# Patient Record
Sex: FEMALE | Race: ASIAN | NOT HISPANIC OR LATINO | Employment: UNEMPLOYED | URBAN - METROPOLITAN AREA
[De-identification: names, ages, dates, MRNs, and addresses within clinical notes are randomized per-mention and may not be internally consistent; named-entity substitution may affect disease eponyms.]

---

## 2023-06-08 ENCOUNTER — OFFICE VISIT (OUTPATIENT)
Dept: PRIMARY CARE CLINIC | Facility: CLINIC | Age: 38
End: 2023-06-08
Payer: COMMERCIAL

## 2023-06-08 VITALS
OXYGEN SATURATION: 97 % | WEIGHT: 112.38 LBS | HEART RATE: 73 BPM | SYSTOLIC BLOOD PRESSURE: 112 MMHG | BODY MASS INDEX: 22.06 KG/M2 | HEIGHT: 60 IN | DIASTOLIC BLOOD PRESSURE: 75 MMHG | TEMPERATURE: 99 F | RESPIRATION RATE: 17 BRPM

## 2023-06-08 DIAGNOSIS — E78.00 HYPERCHOLESTEROLEMIA: ICD-10-CM

## 2023-06-08 DIAGNOSIS — N84.0 UTERINE POLYP: ICD-10-CM

## 2023-06-08 DIAGNOSIS — Z00.00 ENCOUNTER FOR MEDICAL EXAMINATION TO ESTABLISH CARE: Primary | ICD-10-CM

## 2023-06-08 DIAGNOSIS — R10.9 ABDOMINAL DISCOMFORT: ICD-10-CM

## 2023-06-08 DIAGNOSIS — R97.1 ELEVATED CA-125: ICD-10-CM

## 2023-06-08 DIAGNOSIS — J30.0 VASOMOTOR RHINITIS: ICD-10-CM

## 2023-06-08 DIAGNOSIS — Z00.00 PREVENTATIVE HEALTH CARE: ICD-10-CM

## 2023-06-08 DIAGNOSIS — E55.9 VITAMIN D DEFICIENCY: ICD-10-CM

## 2023-06-08 PROCEDURE — 3008F BODY MASS INDEX DOCD: CPT | Mod: CPTII,,, | Performed by: FAMILY MEDICINE

## 2023-06-08 PROCEDURE — 3074F PR MOST RECENT SYSTOLIC BLOOD PRESSURE < 130 MM HG: ICD-10-PCS | Mod: CPTII,,, | Performed by: FAMILY MEDICINE

## 2023-06-08 PROCEDURE — 1159F MED LIST DOCD IN RCRD: CPT | Mod: CPTII,,, | Performed by: FAMILY MEDICINE

## 2023-06-08 PROCEDURE — 99385 PR PREVENTIVE VISIT,NEW,18-39: ICD-10-PCS | Mod: ,,, | Performed by: FAMILY MEDICINE

## 2023-06-08 PROCEDURE — 3074F SYST BP LT 130 MM HG: CPT | Mod: CPTII,,, | Performed by: FAMILY MEDICINE

## 2023-06-08 PROCEDURE — 1160F PR REVIEW ALL MEDS BY PRESCRIBER/CLIN PHARMACIST DOCUMENTED: ICD-10-PCS | Mod: CPTII,,, | Performed by: FAMILY MEDICINE

## 2023-06-08 PROCEDURE — 99385 PREV VISIT NEW AGE 18-39: CPT | Mod: ,,, | Performed by: FAMILY MEDICINE

## 2023-06-08 PROCEDURE — 3078F PR MOST RECENT DIASTOLIC BLOOD PRESSURE < 80 MM HG: ICD-10-PCS | Mod: CPTII,,, | Performed by: FAMILY MEDICINE

## 2023-06-08 PROCEDURE — 1160F RVW MEDS BY RX/DR IN RCRD: CPT | Mod: CPTII,,, | Performed by: FAMILY MEDICINE

## 2023-06-08 PROCEDURE — 3008F PR BODY MASS INDEX (BMI) DOCUMENTED: ICD-10-PCS | Mod: CPTII,,, | Performed by: FAMILY MEDICINE

## 2023-06-08 PROCEDURE — 3078F DIAST BP <80 MM HG: CPT | Mod: CPTII,,, | Performed by: FAMILY MEDICINE

## 2023-06-08 PROCEDURE — 1159F PR MEDICATION LIST DOCUMENTED IN MEDICAL RECORD: ICD-10-PCS | Mod: CPTII,,, | Performed by: FAMILY MEDICINE

## 2023-06-08 NOTE — PROGRESS NOTES
Bhavna Dillon  06/08/2023  [id number]    Subjective:      Patient ID: Bhavna Dillon is a 37 y.o. female.    Chief Complaint: Establish Care    Disclaimer:  This note is prepared using voice recognition software and as such is likely to have errors despite attempts at proofreading. Please contact me for questions.     37-year-old female who presents with her  to establish care.  The patient admits to a history of hypercholesterolemia, uterine polyp and vasomotor rhinitis.  She states that she was previously living in [facility] [place] and had a turbinectomy however it is unclear if the patient had rhinoplasty.  She states that she was prescribed multiple nasal sprays by her previous ENT.  She admits to intermittent sinus discomfort as well as headaches associated with sinus discomfort.  The patient states that she is had GI discomfort for multiple months.  She describes the GI discomfort as abdominal discomfort, bloating, reflux, occasional nausea, but diarrhea is rare and she denies any constipation.  Symptoms also seem to be related to eating.  She states that she had a full workup by GI in [facility] [place] including EGD that was benign.  The patient requests repeat CA -25 as she states it has been elevated in the past and she has a history of uterine polyps.  The patient is a nonsmoker and denies any recreational drug use but admits to drinking alcohol socially.    History:  Past Medical History:   Diagnosis Date    Hypercholesterolemia 6/8/2023    Uterine polyp 6/8/2023    Vasomotor rhinitis 6/8/2023     Past Surgical History:   Procedure Laterality Date    ADENOIDECTOMY       Family History   Problem Relation Age of Onset    Diabetes type II Father     Asthma Father     Diabetes Maternal Grandmother     Ovarian cancer Maternal Grandmother     Heart attacks under age 50 Paternal Grandfather      Social History     Socioeconomic History    Marital status:    Tobacco Use    Smoking status: Never    Smokeless tobacco:  Never   Substance and Sexual Activity    Alcohol use: Yes     Alcohol/week: 5.0 standard drinks     Types: 5 Glasses of wine per week    Drug use: Never    Sexual activity: Yes     Patient Active Problem List   Diagnosis    Vasomotor rhinitis    Vitamin D deficiency    Uterine polyp    Hypercholesterolemia     Review of patient's allergies indicates:   Allergen Reactions    Shrimp Itching     The following were reviewed at this visit: active problem list, medication list, allergies, family history, social history, and health maintenance.    Medications:  No current outpatient medications on file prior to visit.     No current facility-administered medications on file prior to visit.       Review of Systems   Constitutional:  Negative for chills, fever and weight loss.   HENT:  Negative for sore throat.    Eyes:  Negative for blurred vision and double vision.   Respiratory:  Negative for cough and shortness of breath.    Cardiovascular:  Negative for chest pain.   Gastrointestinal:  Positive for nausea. Negative for constipation, diarrhea and vomiting.        Bloating and abdominal discomfort intermittently   Genitourinary:  Negative for frequency.   Musculoskeletal:  Negative for myalgias.   Skin:  Negative for rash.   Neurological:  Positive for headaches. Negative for dizziness, seizures and weakness.   All other systems reviewed and are negative.    Objective:     Vitals:    06/08/23 1324   BP: 112/75   BP Location: Left arm   Patient Position: Sitting   BP Method: Small (Automatic)   Pulse: 73   Resp: 17   Temp: 98.5 °F (36.9 °C)   TempSrc: Oral   SpO2: 97%   Weight: 51 kg (112 lb 6.4 oz)   Height: 5' (1.524 m)     Physical Exam  Constitutional:       General: She is not in acute distress.     Appearance: She is not toxic-appearing or diaphoretic.   HENT:      Head: Normocephalic and atraumatic.      Right Ear: Tympanic membrane, ear canal and external ear normal. There is no impacted cerumen.      Left Ear:  Tympanic membrane, ear canal and external ear normal. There is no impacted cerumen.      Nose: Nose normal.      Mouth/Throat:      Mouth: Mucous membranes are moist.      Pharynx: Oropharynx is clear. No oropharyngeal exudate or posterior oropharyngeal erythema.   Eyes:      General: No scleral icterus.     Extraocular Movements: Extraocular movements intact.      Conjunctiva/sclera: Conjunctivae normal.   Cardiovascular:      Rate and Rhythm: Normal rate and regular rhythm.      Heart sounds: Normal heart sounds. No murmur heard.    No friction rub. No gallop.   Pulmonary:      Effort: Pulmonary effort is normal. No respiratory distress.      Breath sounds: Normal breath sounds. No stridor. No wheezing, rhonchi or rales.   Abdominal:      General: Abdomen is flat. Bowel sounds are normal. There is no distension.      Palpations: Abdomen is soft. There is no mass.      Tenderness: There is generalized abdominal tenderness. There is no guarding or rebound. Negative signs include McBurney's sign.      Hernia: No hernia is present.      Comments: Tenderness very mild   Musculoskeletal:         General: Normal range of motion.      Cervical back: Normal range of motion and neck supple. No rigidity.   Lymphadenopathy:      Cervical: No cervical adenopathy.   Skin:     General: Skin is warm and dry.      Coloration: Skin is not pale.   Neurological:      General: No focal deficit present.      Mental Status: She is alert and oriented to person, place, and time. Mental status is at baseline.   Psychiatric:         Mood and Affect: Mood normal.         Behavior: Behavior normal.         Thought Content: Thought content normal.         Judgment: Judgment normal.       Assessment:     1. Encounter for medical examination to establish care    2. Abdominal discomfort    3. Hypercholesterolemia    4. Vasomotor rhinitis    5. Uterine polyp    6. Vitamin D deficiency    7. Preventative health care    8. Elevated CA-125      Plan:    Bhavna was seen today for establish care.    Diagnoses and all orders for this visit:    Encounter for medical examination to establish care  Recommend annual eye exam and biannual dental exams.  Limit caffeine and alcohol intake.  Well balanced diet low in sugar/complex carbohydrates and increased vegetable intake encouraged.  Moderate intensity exercise 30 min/day at least 5 days/Wk (total 150 min/Wk) recommended.    Abdominal discomfort  Ddx: GERD, IBS-D, constipation  Trial of Pepcid 20 mg PO BID.  Increase water intake.  Avoid lying down after eating.  Avoid acidic or heavily seasoned foods such as tomato sauce, citrus fruits, etc.  Identify trigger foods and avoid if possible.  Increase water intake.  Monitor for worsening symptoms and contact clinic if any concerns arise.  Patient admits to previous benign EGD.    Hypercholesterolemia  FLP ordered and pending.  Continue current medications:   Denies chest pain, SOB, headaches, dizziness, or muscle cramps.  Consider Flax Seed/Fish Oil supplements. Increase dietary fiber intake.  Recommend low fat, low cholesterol diet and exercise.    Vasomotor rhinitis  Patient has had symptoms of allergic rhinitis as well.  Hx if turbinectomy bilaterally.  Trial of Flonase as needed.  Headaches appear to be sinus related.  May consider daily oral antihistamine.  Will consider ENT referral if symptoms persist.    Uterine polyp  Referral to Ob/Gyn sent to establish care.    Vitamin D deficiency  -     Vitamin D; Future    Preventative health care  -     CBC Auto Differential; Future  -     Comprehensive Metabolic Panel; Future  -     Hemoglobin A1C; Future  -     Lipid Panel; Future  -     TSH; Future  -     Urinalysis, Reflex to Urine Culture; Future    Elevated CA-125  -     ; Future  Hx of elevated CA-125.  Repeat CA-125 ordered and pending.    Follow up: Follow up in about 8 weeks (around 8/3/2023) for Wellness Visit.    After visit summary was printed and given to  patient upon discharge today.  Bhavna Dillon was given education on their disease process and medications.

## 2023-09-07 ENCOUNTER — CLINICAL SUPPORT (OUTPATIENT)
Dept: URGENT CARE | Facility: CLINIC | Age: 38
End: 2023-09-07
Payer: COMMERCIAL

## 2023-09-07 DIAGNOSIS — E55.9 VITAMIN D DEFICIENCY: ICD-10-CM

## 2023-09-07 DIAGNOSIS — R97.1 ELEVATED CA-125: ICD-10-CM

## 2023-09-07 DIAGNOSIS — Z00.00 PREVENTATIVE HEALTH CARE: ICD-10-CM

## 2023-09-07 DIAGNOSIS — E78.00 HYPERCHOLESTEROLEMIA: ICD-10-CM

## 2023-09-07 LAB
ALBUMIN SERPL-MCNC: 4.5 G/DL (ref 3.5–5)
ALBUMIN/GLOB SERPL: 1.1 RATIO (ref 1.1–2)
ALP SERPL-CCNC: 46 UNIT/L (ref 40–150)
ALT SERPL-CCNC: 11 UNIT/L (ref 0–55)
APPEARANCE UR: ABNORMAL
AST SERPL-CCNC: 15 UNIT/L (ref 5–34)
BACTERIA #/AREA URNS AUTO: ABNORMAL /HPF
BASOPHILS # BLD AUTO: 0.04 X10(3)/MCL
BASOPHILS NFR BLD AUTO: 0.7 %
BILIRUB SERPL-MCNC: 0.5 MG/DL
BILIRUB UR QL STRIP.AUTO: NEGATIVE
BUN SERPL-MCNC: 12.7 MG/DL (ref 7–18.7)
CALCIUM SERPL-MCNC: 10.1 MG/DL (ref 8.4–10.2)
CANCER AG125 SERPL-ACNC: 31.8 UNIT/ML (ref 0–35)
CHLORIDE SERPL-SCNC: 107 MMOL/L (ref 98–107)
CHOLEST SERPL-MCNC: 243 MG/DL
CHOLEST/HDLC SERPL: 4 {RATIO} (ref 0–5)
CO2 SERPL-SCNC: 24 MMOL/L (ref 22–29)
COLOR UR: YELLOW
CREAT SERPL-MCNC: 0.86 MG/DL (ref 0.55–1.02)
DEPRECATED CALCIDIOL+CALCIFEROL SERPL-MC: 45.3 NG/ML (ref 30–80)
EOSINOPHIL # BLD AUTO: 0.17 X10(3)/MCL (ref 0–0.9)
EOSINOPHIL NFR BLD AUTO: 2.8 %
ERYTHROCYTE [DISTWIDTH] IN BLOOD BY AUTOMATED COUNT: 11.9 % (ref 11.5–17)
EST. AVERAGE GLUCOSE BLD GHB EST-MCNC: 96.8 MG/DL
GFR SERPLBLD CREATININE-BSD FMLA CKD-EPI: >60 MLS/MIN/1.73/M2
GLOBULIN SER-MCNC: 4 GM/DL (ref 2.4–3.5)
GLUCOSE SERPL-MCNC: 100 MG/DL (ref 74–100)
GLUCOSE UR QL STRIP.AUTO: NEGATIVE
HBA1C MFR BLD: 5 %
HCT VFR BLD AUTO: 44.1 % (ref 37–47)
HDLC SERPL-MCNC: 66 MG/DL (ref 35–60)
HGB BLD-MCNC: 14.7 G/DL (ref 12–16)
IMM GRANULOCYTES # BLD AUTO: 0.01 X10(3)/MCL (ref 0–0.04)
IMM GRANULOCYTES NFR BLD AUTO: 0.2 %
KETONES UR QL STRIP.AUTO: NEGATIVE
LDLC SERPL CALC-MCNC: 159 MG/DL (ref 50–140)
LEUKOCYTE ESTERASE UR QL STRIP.AUTO: ABNORMAL
LYMPHOCYTES # BLD AUTO: 2.45 X10(3)/MCL (ref 0.6–4.6)
LYMPHOCYTES NFR BLD AUTO: 40.4 %
MCH RBC QN AUTO: 30.8 PG (ref 27–31)
MCHC RBC AUTO-ENTMCNC: 33.3 G/DL (ref 33–36)
MCV RBC AUTO: 92.5 FL (ref 80–94)
MONOCYTES # BLD AUTO: 0.4 X10(3)/MCL (ref 0.1–1.3)
MONOCYTES NFR BLD AUTO: 6.6 %
NEUTROPHILS # BLD AUTO: 2.99 X10(3)/MCL (ref 2.1–9.2)
NEUTROPHILS NFR BLD AUTO: 49.3 %
NITRITE UR QL STRIP.AUTO: NEGATIVE
NRBC BLD AUTO-RTO: 0 %
PH UR STRIP.AUTO: 6 [PH]
PLATELET # BLD AUTO: 352 X10(3)/MCL (ref 130–400)
PMV BLD AUTO: 9.7 FL (ref 7.4–10.4)
POTASSIUM SERPL-SCNC: 4.8 MMOL/L (ref 3.5–5.1)
PROT SERPL-MCNC: 8.5 GM/DL (ref 6.4–8.3)
PROT UR QL STRIP.AUTO: NEGATIVE
RBC # BLD AUTO: 4.77 X10(6)/MCL (ref 4.2–5.4)
RBC #/AREA URNS AUTO: ABNORMAL /HPF
RBC UR QL AUTO: ABNORMAL
SODIUM SERPL-SCNC: 140 MMOL/L (ref 136–145)
SP GR UR STRIP.AUTO: 1.02 (ref 1–1.03)
SQUAMOUS #/AREA URNS AUTO: ABNORMAL /HPF
TRIGL SERPL-MCNC: 92 MG/DL (ref 37–140)
TSH SERPL-ACNC: 1.7 UIU/ML (ref 0.35–4.94)
UROBILINOGEN UR STRIP-ACNC: 0.2
VLDLC SERPL CALC-MCNC: 18 MG/DL
WBC # SPEC AUTO: 6.06 X10(3)/MCL (ref 4.5–11.5)
WBC #/AREA URNS AUTO: ABNORMAL /HPF

## 2023-09-07 PROCEDURE — 36415 COLL VENOUS BLD VENIPUNCTURE: CPT

## 2023-09-07 NOTE — PROGRESS NOTES
Patient present to clinic for wellness labs under the order of Dr. Singleton. Labs drawn with one attempt to left antecubital space. Patient tolerated blood draw well.

## 2023-09-13 ENCOUNTER — TELEPHONE (OUTPATIENT)
Dept: PRIMARY CARE CLINIC | Facility: CLINIC | Age: 38
End: 2023-09-13
Payer: COMMERCIAL

## 2023-09-13 NOTE — TELEPHONE ENCOUNTER
----- Message from Glenis Singleton MD sent at 9/12/2023  4:21 PM CDT -----  Labs reviewed and will be discussed with patient at upcoming visit in 1 week however patient did have abnormal urinalysis and urine culture not done if patient is symptomatic recommend repeat urinalysis.  Patient also found to have elevated cholesterol and recommend low-fat low-cholesterol diet (which will be discussed further during visit).

## 2023-10-17 ENCOUNTER — OFFICE VISIT (OUTPATIENT)
Dept: PRIMARY CARE CLINIC | Facility: CLINIC | Age: 38
End: 2023-10-17
Payer: COMMERCIAL

## 2023-10-17 VITALS
BODY MASS INDEX: 22.19 KG/M2 | HEART RATE: 83 BPM | TEMPERATURE: 98 F | SYSTOLIC BLOOD PRESSURE: 108 MMHG | DIASTOLIC BLOOD PRESSURE: 74 MMHG | RESPIRATION RATE: 18 BRPM | WEIGHT: 113 LBS | OXYGEN SATURATION: 99 % | HEIGHT: 60 IN

## 2023-10-17 DIAGNOSIS — Z00.00 ENCOUNTER FOR PREVENTATIVE ADULT HEALTH CARE EXAMINATION: Primary | ICD-10-CM

## 2023-10-17 DIAGNOSIS — Z12.4 ENCOUNTER FOR SCREENING FOR CERVICAL CANCER: ICD-10-CM

## 2023-10-17 DIAGNOSIS — R82.90 ABNORMAL URINALYSIS: ICD-10-CM

## 2023-10-17 DIAGNOSIS — E78.5 HYPERLIPIDEMIA LDL GOAL <130: ICD-10-CM

## 2023-10-17 PROBLEM — E55.9 VITAMIN D DEFICIENCY: Status: RESOLVED | Noted: 2023-06-08 | Resolved: 2023-10-17

## 2023-10-17 PROCEDURE — 3074F PR MOST RECENT SYSTOLIC BLOOD PRESSURE < 130 MM HG: ICD-10-PCS | Mod: CPTII,,, | Performed by: FAMILY MEDICINE

## 2023-10-17 PROCEDURE — 1160F RVW MEDS BY RX/DR IN RCRD: CPT | Mod: CPTII,,, | Performed by: FAMILY MEDICINE

## 2023-10-17 PROCEDURE — 3008F PR BODY MASS INDEX (BMI) DOCUMENTED: ICD-10-PCS | Mod: CPTII,,, | Performed by: FAMILY MEDICINE

## 2023-10-17 PROCEDURE — 3044F HG A1C LEVEL LT 7.0%: CPT | Mod: CPTII,,, | Performed by: FAMILY MEDICINE

## 2023-10-17 PROCEDURE — 1160F PR REVIEW ALL MEDS BY PRESCRIBER/CLIN PHARMACIST DOCUMENTED: ICD-10-PCS | Mod: CPTII,,, | Performed by: FAMILY MEDICINE

## 2023-10-17 PROCEDURE — 1159F PR MEDICATION LIST DOCUMENTED IN MEDICAL RECORD: ICD-10-PCS | Mod: CPTII,,, | Performed by: FAMILY MEDICINE

## 2023-10-17 PROCEDURE — 99395 PREV VISIT EST AGE 18-39: CPT | Mod: ,,, | Performed by: FAMILY MEDICINE

## 2023-10-17 PROCEDURE — 3008F BODY MASS INDEX DOCD: CPT | Mod: CPTII,,, | Performed by: FAMILY MEDICINE

## 2023-10-17 PROCEDURE — 3078F DIAST BP <80 MM HG: CPT | Mod: CPTII,,, | Performed by: FAMILY MEDICINE

## 2023-10-17 PROCEDURE — 99395 PR PREVENTIVE VISIT,EST,18-39: ICD-10-PCS | Mod: ,,, | Performed by: FAMILY MEDICINE

## 2023-10-17 PROCEDURE — 3074F SYST BP LT 130 MM HG: CPT | Mod: CPTII,,, | Performed by: FAMILY MEDICINE

## 2023-10-17 PROCEDURE — 3078F PR MOST RECENT DIASTOLIC BLOOD PRESSURE < 80 MM HG: ICD-10-PCS | Mod: CPTII,,, | Performed by: FAMILY MEDICINE

## 2023-10-17 PROCEDURE — 3044F PR MOST RECENT HEMOGLOBIN A1C LEVEL <7.0%: ICD-10-PCS | Mod: CPTII,,, | Performed by: FAMILY MEDICINE

## 2023-10-17 PROCEDURE — 1159F MED LIST DOCD IN RCRD: CPT | Mod: CPTII,,, | Performed by: FAMILY MEDICINE

## 2023-10-17 NOTE — PROGRESS NOTES
Patient ID: [id number]     Chief Complaint: Annual Exam        HPI:   Disclaimer:  This note is prepared using voice recognition software and as such is likely to have errors despite attempts at proofreading. Please contact me for questions.     Bhavna Dillon is a 38 y.o. female here today for an annual wellness visit. No other complaints today.   Patient admits to daily intermittent fasting home cooked well-balanced meals for dinner.  She is not currently engaging in routine exercise and wellness labs revealed elevated total cholesterol and LDL.  The patient's last eye and dental exams was in 09/2022 when living in [facility].     Cervical Cancer Screening - Last Pap in 2022 in [facility]. No of recent abnormal pap smears.  Breast Cancer Screening - Due at age 40, denies family history of breast cancer.  Colon Cancer Screening - Due at age 45, no known family history of colon cancer.  Osteoporosis Screening - Not yet due.  Vaccinations -   Patient counseled on vaccinations currently do including tetanus, influenza, and COVID-19 booster.  She will proceed to [facility] unit for vaccinations.      Past Medical History:   Diagnosis Date    Hypercholesterolemia 6/8/2023    Uterine polyp 6/8/2023    Vasomotor rhinitis 6/8/2023        Past Surgical History:   Procedure Laterality Date    ADENOIDECTOMY         Review of patient's allergies indicates:   Allergen Reactions    Shrimp Itching       No outpatient medications have been marked as taking for the 10/17/23 encounter (Office Visit) with Glenis Singleton MD.       Social History     Socioeconomic History    Marital status:    Tobacco Use    Smoking status: Never    Smokeless tobacco: Never   Substance and Sexual Activity    Alcohol use: Yes     Alcohol/week: 5.0 standard drinks of alcohol     Types: 5 Glasses of wine per week    Drug use: Never    Sexual activity: Yes     Partners: Male        Family History   Problem Relation Age of Onset    Diabetes  type II Father     Asthma Father     Diabetes Maternal Grandmother     Ovarian cancer Maternal Grandmother     Heart attacks under age 50 Paternal Grandfather         Lab Results   Component Value Date    WBC 6.06 09/07/2023    HGB 14.7 09/07/2023    HCT 44.1 09/07/2023     09/07/2023    CHOL 243 (H) 09/07/2023    TRIG 92 09/07/2023    HDL 66 (H) 09/07/2023    ALT 11 09/07/2023    AST 15 09/07/2023     09/07/2023    K 4.8 09/07/2023    CREATININE 0.86 09/07/2023    BUN 12.7 09/07/2023    CO2 24 09/07/2023    TSH 1.703 09/07/2023    HGBA1C 5.0 09/07/2023       Subjective:     Review of Systems:   Review of Systems   Constitutional:  Negative for chills, diaphoresis and fever.   Eyes:  Negative for blurred vision and double vision.   Respiratory:  Negative for cough and shortness of breath.    Cardiovascular:  Negative for chest pain and leg swelling.   Gastrointestinal:  Negative for abdominal pain, diarrhea, nausea and vomiting.   Skin:  Negative for rash.   Neurological:  Negative for dizziness, tremors and headaches.        See HPI for details    Objective:     /74 (BP Location: Right arm, Patient Position: Sitting, BP Method: Small (Automatic))   Pulse 83   Temp 97.9 °F (36.6 °C) (Oral)   Resp 18   Ht 5' (1.524 m)   Wt 51.3 kg (113 lb)   LMP 10/08/2023   SpO2 99%   BMI 22.07 kg/m²     Physical Exam:  Physical Exam  Constitutional:       General: She is not in acute distress.     Appearance: She is not toxic-appearing or diaphoretic.   HENT:      Head: Normocephalic and atraumatic.      Right Ear: Tympanic membrane, ear canal and external ear normal. There is no impacted cerumen.      Left Ear: Tympanic membrane, ear canal and external ear normal. There is no impacted cerumen.      Nose: Nose normal.      Mouth/Throat:      Mouth: Mucous membranes are moist.      Pharynx: Oropharynx is clear. No oropharyngeal exudate or posterior oropharyngeal erythema.   Eyes:      General: No scleral  icterus.     Extraocular Movements: Extraocular movements intact.      Conjunctiva/sclera: Conjunctivae normal.   Cardiovascular:      Rate and Rhythm: Normal rate and regular rhythm.      Heart sounds: Normal heart sounds. No murmur heard.     No friction rub. No gallop.   Pulmonary:      Effort: Pulmonary effort is normal. No respiratory distress.      Breath sounds: Normal breath sounds. No stridor. No wheezing, rhonchi or rales.   Musculoskeletal:         General: Normal range of motion.      Cervical back: Normal range of motion and neck supple. No rigidity.   Lymphadenopathy:      Cervical: No cervical adenopathy.   Skin:     General: Skin is warm and dry.      Coloration: Skin is not pale.   Neurological:      General: No focal deficit present.      Mental Status: She is alert and oriented to person, place, and time. Mental status is at baseline.   Psychiatric:         Mood and Affect: Mood normal.         Behavior: Behavior normal.         Thought Content: Thought content normal.         Judgment: Judgment normal.         Assessment:       ICD-10-CM ICD-9-CM   1. Encounter for preventative adult health care examination  Z00.00 V70.0   2. Hyperlipidemia LDL goal <130  E78.5 272.4   3. Encounter for screening for cervical cancer  Z12.4 V76.2   4. Abnormal urinalysis  R82.90 791.9        Plan:     1. Encounter for preventative adult health care examination  - HIV 1/2 Ag/Ab (4th Gen); Future  - Hepatitis C Antibody; Future  Recommend annual eye exam and biannual dental exams.  Limit caffeine and alcohol intake.  Well balanced diet low in sugar/complex carbohydrates and increased vegetable intake encouraged.  Moderate intensity exercise 30 min/day at least 5 days/Wk (total 150 min/Wk) recommended.  Maintain healthy BMI.  Wellness labs reviewed in clinic.  See above preventative health screening at today's visit.    2. Hyperlipidemia LDL goal <130.  - Lipid Panel; Future  No recent chest pain, SOB, headaches,  dizziness, or muscle cramps.  Repeat FLP in 6 months.  Total cholesterol >200 and LDL >130.  Consider Flax Seed/Fish Oil supplements. Increase dietary fiber intake.  Recommend low fat, low cholesterol diet and exercise.    3. Encounter for screening for cervical cancer  Records to be requested for recent pap smear 1 year ago.  Repeat in 2025.    4. Abnormal urinalysis  - Urinalysis, Reflex to Urine Culture; Future    Follow up in about 6 months (around 4/17/2024) for HLD Follow Up.

## 2024-01-10 DIAGNOSIS — Z31.69 INFERTILITY COUNSELING: Primary | ICD-10-CM

## 2024-02-01 ENCOUNTER — TELEPHONE (OUTPATIENT)
Dept: FAMILY MEDICINE | Facility: CLINIC | Age: 39
End: 2024-02-01
Payer: COMMERCIAL

## 2024-02-01 NOTE — TELEPHONE ENCOUNTER
----- Message from Nimisha Ponce sent at 1/31/2024  3:00 PM CST -----  Regarding: labs  .Type:  Needs Medical Advice    Who Called: Pt    Symptoms (please be specific): n/a     How long has patient had these symptoms:  n/a    Pharmacy name and phone #:  n/a    Would the patient rather a call back or a response via [facility]?     Best Call Back Number: [phone]    Additional Information: Pt called in to inquire about labs that she was informed would be ordered by Dr. Singleton for infertility issues; She would like to schedule a nurse visit for labs w/ orders as well. Please advise. Thanks.

## 2024-02-01 NOTE — TELEPHONE ENCOUNTER
Returned patient call at this time. Patient wants to know the next steps for her and  r/t her fertility. I explained to patient the 1st step is getting labs done that were ordered on 1/10/24 and that you would f/u with her with results.

## 2024-03-20 PROBLEM — E78.5 HYPERLIPIDEMIA LDL GOAL <130: Status: ACTIVE | Noted: 2023-06-08

## 2024-03-28 ENCOUNTER — TELEPHONE (OUTPATIENT)
Dept: FAMILY MEDICINE | Facility: CLINIC | Age: 39
End: 2024-03-28
Payer: COMMERCIAL

## 2024-03-28 NOTE — TELEPHONE ENCOUNTER
Pt notified of vaccinations due. Pt advised to call pharmacy for Hep A vaccination. Pt verbalized understanding.

## 2024-03-28 NOTE — TELEPHONE ENCOUNTER
----- Message from Pili Hurst sent at 3/28/2024  1:50 PM CDT -----  Regarding: medical advice  Type:  Needs Medical Advice    Who Called:  pt    Would the patient rather a call back or a response via [facility]?  Call back    Best Call Back Number:  [phone]    Additional Information:  pt called to discuss which shots she is missing, so that she will know which to take, please advise, thanks

## 2024-04-08 ENCOUNTER — TELEPHONE (OUTPATIENT)
Dept: FAMILY MEDICINE | Facility: CLINIC | Age: 39
End: 2024-04-08
Payer: COMMERCIAL

## 2024-04-08 NOTE — TELEPHONE ENCOUNTER
----- Message from Nimisha Ponce sent at 4/8/2024  1:58 PM CDT -----  Regarding: vaccine list  .Type:  Needs Medical Advice    Who Called: Pt    Symptoms (please be specific):      How long has patient had these symptoms:      Pharmacy name and phone #:      Would the patient rather a call back or a response via [facility]? MARCELLE    Best Call Back Number: [phone]    Additional Information: Pt is requesting a printout of the vaccinations that she has received; she needs this documentation for her green card application; please advise. Thanks.

## 2024-04-08 NOTE — TELEPHONE ENCOUNTER
----- Message from Nimisha Ponce sent at 4/8/2024  2:06 PM CDT -----  Regarding: medication  .Type:  Needs Medical Advice    Who Called: Pt      Symptoms (please be specific):      How long has patient had these symptoms:      Pharmacy name and phone #:      Would the patient rather a call back or a response via [facility]?     Best Call Back Number: [phone]    Additional Information: Pt would like to know if when the time has come, if the provider will refill her medication for bloating/stomach issues; she states that the medication is from her doctor in [facility] [place]; the medication is listed below; please advise.       Domperidone 10 mg

## 2024-04-16 ENCOUNTER — TELEPHONE (OUTPATIENT)
Dept: FAMILY MEDICINE | Facility: CLINIC | Age: 39
End: 2024-04-16
Payer: COMMERCIAL

## 2024-04-16 NOTE — TELEPHONE ENCOUNTER
Pt requested vaccination records. Pt notified Flu vaccine is the only vaccine in the records. Pt requesting hep a and hep b records. Advised pt per Dr. Singleton- request records from [facility] [place] and translate into english. Pt advised to speak with [facility] in [place] for vaccinations needed. Pt laughed and stated the doctor can not say that and the office can not help them. Pt disconnected the phone call.

## 2024-04-16 NOTE — TELEPHONE ENCOUNTER
----- Message from Kenya Logan LPN sent at 4/16/2024  3:44 PM CDT -----  Regarding: FW: return call    ----- Message -----  From: Nimisha Ponce  Sent: 4/16/2024   9:08 AM CDT  To: Bj COLBERT Staff  Subject: return call                                      .Type:  Patient Returning Call    Who Called:pt    Who Left Message for Patient:    Does the patient know what this is regarding?:    Would the patient rather a call back or a response via [facility]?     Best Call Back Number:[phone]    Additional Information: pt states that she received a missed call from this clinic with no name left on the voicemail; please advise, if needed.

## 2024-04-16 NOTE — TELEPHONE ENCOUNTER
I spoke with patient and explained everything to her regarding her vaccinations. She was asking if her blood was checked to see if she had the things in her blood for things such as hepatitis (vaccine antibodies). I advised her the that type of blood work wasn't checked including the hepatitis C. I explained to her again that if she was able to get her vaccinations records and translate them, our office would be able to add them to her records and re-print her vaccinations records. I advised her if she wanted titer levels drawn for the vaccinations, we would be able to order those. She stated she will be seeing the civil servant doctor tomorrow and she what he says is needed. Angela

## 2024-04-17 ENCOUNTER — TELEPHONE (OUTPATIENT)
Dept: FAMILY MEDICINE | Facility: CLINIC | Age: 39
End: 2024-04-17

## 2024-04-17 NOTE — TELEPHONE ENCOUNTER
----- Message from Nimisha Sam sent at 4/16/2024  3:26 PM CDT -----  Regarding: callback  .Type:  Patient Returning Call    Who Called:pt    Who Left Message for Patient:    Does the patient know what this is regarding?:    Would the patient rather a call back or a response via [facility]? Moose    Best Call Back Number:[phone]    Additional Information: pt states that she called on last week and didn't receive a callback; she picked up a copy of her shot records, but it only lists her flu shot; she needs it to list all of her immunizations; contact pt for more clarification; pt needs this for tomorrow, 04/17.

## 2024-05-09 ENCOUNTER — TELEPHONE (OUTPATIENT)
Dept: FAMILY MEDICINE | Facility: CLINIC | Age: 39
End: 2024-05-09
Payer: COMMERCIAL

## 2024-05-09 DIAGNOSIS — Z92.29 HISTORY OF MMR VACCINATION: Primary | ICD-10-CM

## 2024-05-09 DIAGNOSIS — Z11.1 TUBERCULOSIS SCREENING: ICD-10-CM

## 2024-05-09 NOTE — TELEPHONE ENCOUNTER
----- Message from Ela Gillis sent at 5/9/2024  4:03 PM CDT -----  .Type:  Patient Returning Call    Who Called:pt   Who Left Message for Patient:  Does the patient know what this is regarding?:calling because she does not know how to upload a document. Called the back line no answer   Would the patient rather a call back or a response via [facility]? Call back   Best Call Back Number:[phone]  Additional Information:

## 2024-05-09 NOTE — TELEPHONE ENCOUNTER
I have signed for the following orders AND/OR meds.  Please call the patient and ask the patient to schedule the testing AND/OR inform about any medications that were sent.      Orders Placed This Encounter   Procedures    Rubeola antibody IgG     Standing Status:   Future     Standing Expiration Date:   5/9/2025    Rubella antibody, IgG     Standing Status:   Future     Standing Expiration Date:   5/9/2025    Quantiferon Gold TB     Standing Status:   Future     Standing Expiration Date:   8/7/2025

## 2024-05-09 NOTE — TELEPHONE ENCOUNTER
----- Message from Cherry Vasquez sent at 5/9/2024  2:26 PM CDT -----  Regarding: returning call  .Who Called: Bhavna Dillon    Patient is returning phone call    Who Left Message for Patient:Kerline  Does the patient know what this is regarding?:blood work/vaccines       Preferred Method of Contact: Phone Call  Patient's Preferred Phone Number on File: [phone]   Best Call Back Number, if different:  Additional Information: pt returning call ABL no answer

## 2024-05-09 NOTE — TELEPHONE ENCOUNTER
JEM:  I spoke with patient and she saw the civilian service doctor who recommended her to get some updated vaccine titer labs. She does have the form given to her. She is also recommending a letter stating she has a contraindication to the covid vaccine because she wants to put it off for right now. She stated she just had covid in November, she feels protected right now.    I explained to her that she needs to send the form given to her with the specific lab order information via the portal as well as an explanation of her request for the covid letter. She also stated that she may need a TDAP vaccine. I did advise her that I couldn't promise that Dr. Singleton would right the letter for the covid vaccine. She voiced understanding and will send the requested information via the portal/hannah. Angela      Patient is aware our office will respond via the portal or give her a call tomorrow or Monday. Angela

## 2024-05-23 ENCOUNTER — LAB VISIT (OUTPATIENT)
Dept: LAB | Facility: HOSPITAL | Age: 39
End: 2024-05-23
Attending: FAMILY MEDICINE
Payer: COMMERCIAL

## 2024-05-23 DIAGNOSIS — Z31.69 INFERTILITY COUNSELING: ICD-10-CM

## 2024-05-23 DIAGNOSIS — Z11.1 TUBERCULOSIS SCREENING: ICD-10-CM

## 2024-05-23 DIAGNOSIS — Z92.29 HISTORY OF MMR VACCINATION: ICD-10-CM

## 2024-05-23 LAB
ESTRADIOL SERPL HS-MCNC: 93 PG/ML
FSH SERPL-ACNC: 2.31 MIU/ML
LH SERPL-ACNC: 2.73 MIU/ML

## 2024-05-23 PROCEDURE — 86762 RUBELLA ANTIBODY: CPT

## 2024-05-23 PROCEDURE — 83498 ASY HYDROXYPROGESTERONE 17-D: CPT

## 2024-05-23 PROCEDURE — 83001 ASSAY OF GONADOTROPIN (FSH): CPT

## 2024-05-23 PROCEDURE — 86480 TB TEST CELL IMMUN MEASURE: CPT

## 2024-05-23 PROCEDURE — 83002 ASSAY OF GONADOTROPIN (LH): CPT

## 2024-05-23 PROCEDURE — 82670 ASSAY OF TOTAL ESTRADIOL: CPT

## 2024-05-23 PROCEDURE — 36415 COLL VENOUS BLD VENIPUNCTURE: CPT

## 2024-05-23 PROCEDURE — 86765 RUBEOLA ANTIBODY: CPT

## 2024-05-24 LAB
MEV IGG SER IA-ACNC: 6.7
MEV IGG SER QL IA: POSITIVE
RUBV IGG SERPL IA-ACNC: 2.1
RUBV IGG SERPL QL IA: POSITIVE

## 2024-05-27 LAB
GAMMA INTERFERON BACKGROUND BLD IA-ACNC: 0.03 IU/ML
M TB IFN-G BLD-IMP: NEGATIVE
M TB IFN-G CD4+ BCKGRND COR BLD-ACNC: -0.01 IU/ML
M TB IFN-G CD4+CD8+ BCKGRND COR BLD-ACNC: 0 IU/ML
MITOGEN IGNF BCKGRD COR BLD-ACNC: 9.97 IU/ML

## 2024-05-29 LAB — 17OHP SERPL-MCNC: 207 NG/DL

## 2024-06-04 ENCOUNTER — TELEPHONE (OUTPATIENT)
Dept: FAMILY MEDICINE | Facility: CLINIC | Age: 39
End: 2024-06-04
Payer: COMMERCIAL

## 2024-06-04 NOTE — TELEPHONE ENCOUNTER
Pt notified TDAP vaccination can be administered at upcoming visit on 5-6-2024. Pt verbalized understanding.

## 2024-06-04 NOTE — TELEPHONE ENCOUNTER
----- Message from Lilian Stack sent at 6/4/2024  9:16 AM CDT -----  .Type:  Patient Returning Call    Who Called:pt  Who Left Message for Patient:pt  Does the patient know what this is regarding?:tdap  Would the patient rather a call back or a response via [facility]?   Best Call Back Number:[phone]  Additional Information: Please call back if patient can get TDAP SHOT,

## 2024-06-06 ENCOUNTER — OFFICE VISIT (OUTPATIENT)
Dept: FAMILY MEDICINE | Facility: CLINIC | Age: 39
End: 2024-06-06
Payer: COMMERCIAL

## 2024-06-06 ENCOUNTER — LAB VISIT (OUTPATIENT)
Dept: LAB | Facility: HOSPITAL | Age: 39
End: 2024-06-06
Attending: FAMILY MEDICINE
Payer: COMMERCIAL

## 2024-06-06 VITALS
TEMPERATURE: 98 F | BODY MASS INDEX: 23.01 KG/M2 | DIASTOLIC BLOOD PRESSURE: 79 MMHG | HEART RATE: 67 BPM | OXYGEN SATURATION: 99 % | RESPIRATION RATE: 18 BRPM | WEIGHT: 117.19 LBS | SYSTOLIC BLOOD PRESSURE: 110 MMHG | HEIGHT: 60 IN

## 2024-06-06 DIAGNOSIS — R14.0 GENERALIZED BLOATING: ICD-10-CM

## 2024-06-06 DIAGNOSIS — R10.9 ABDOMINAL DISCOMFORT: ICD-10-CM

## 2024-06-06 DIAGNOSIS — R10.13 DYSPEPSIA: ICD-10-CM

## 2024-06-06 DIAGNOSIS — E78.5 HYPERLIPIDEMIA LDL GOAL <130: ICD-10-CM

## 2024-06-06 DIAGNOSIS — Z23 NEED FOR DIPHTHERIA-TETANUS-PERTUSSIS (TDAP) VACCINE: Primary | ICD-10-CM

## 2024-06-06 DIAGNOSIS — N97.9 INFERTILITY, FEMALE: ICD-10-CM

## 2024-06-06 LAB
CHOLEST SERPL-MCNC: 252 MG/DL
CHOLEST/HDLC SERPL: 4 {RATIO} (ref 0–5)
HDLC SERPL-MCNC: 63 MG/DL (ref 35–60)
IGA SERPL-MCNC: 181 MG/DL (ref 65–421)
LDLC SERPL CALC-MCNC: 165 MG/DL (ref 50–140)
TRIGL SERPL-MCNC: 121 MG/DL (ref 37–140)
VLDLC SERPL CALC-MCNC: 24 MG/DL

## 2024-06-06 PROCEDURE — 3074F SYST BP LT 130 MM HG: CPT | Mod: CPTII,,, | Performed by: FAMILY MEDICINE

## 2024-06-06 PROCEDURE — 36415 COLL VENOUS BLD VENIPUNCTURE: CPT

## 2024-06-06 PROCEDURE — 3078F DIAST BP <80 MM HG: CPT | Mod: CPTII,,, | Performed by: FAMILY MEDICINE

## 2024-06-06 PROCEDURE — 90471 IMMUNIZATION ADMIN: CPT | Mod: ,,, | Performed by: FAMILY MEDICINE

## 2024-06-06 PROCEDURE — 90715 TDAP VACCINE 7 YRS/> IM: CPT | Mod: ,,, | Performed by: FAMILY MEDICINE

## 2024-06-06 PROCEDURE — 82784 ASSAY IGA/IGD/IGG/IGM EACH: CPT

## 2024-06-06 PROCEDURE — 80061 LIPID PANEL: CPT

## 2024-06-06 PROCEDURE — 1159F MED LIST DOCD IN RCRD: CPT | Mod: CPTII,,, | Performed by: FAMILY MEDICINE

## 2024-06-06 PROCEDURE — 1160F RVW MEDS BY RX/DR IN RCRD: CPT | Mod: CPTII,,, | Performed by: FAMILY MEDICINE

## 2024-06-06 PROCEDURE — 3008F BODY MASS INDEX DOCD: CPT | Mod: CPTII,,, | Performed by: FAMILY MEDICINE

## 2024-06-06 PROCEDURE — 99214 OFFICE O/P EST MOD 30 MIN: CPT | Mod: 25,,, | Performed by: FAMILY MEDICINE

## 2024-06-06 PROCEDURE — 86364 TISS TRNSGLTMNASE EA IG CLAS: CPT

## 2024-06-06 NOTE — PATIENT INSTRUCTIONS
Fish Oil, CoQ10 and Red Yeast Rice are supplements that can be taken for elevated cholesterol.  Simethicone (Gas-x) for bloating or gas.  Recommend trial of antacid such as Prevacid, Nexium or Prilosec for at least 14 days.

## 2024-06-06 NOTE — PROGRESS NOTES
Subjective:      Patient ID: Bhavna Dillon is a 39 y.o. female.    Chief Complaint: Follow-up (6 month f/u.TDAP )    Disclaimer:  This note is prepared using voice recognition software and as such is likely to have errors despite attempts at proofreading. Please contact me for questions.     39yoF who presents for follow up. She admits to decreased pork intake, occasionally taking Fish oil, increasing water intake and intermittently exercising in a sauna. She also admits to concerns for infertility. She states that she and  have been actively attempting pregnancy without success. She requests referral to Ob/Gyn. She also admits to abdominal bloating and dyspepsia after eating.    Preventative Health:  Colorectal cancer screening: Due at age 45.  Breast cancer screening: Due at age 40.  Cervical cancer screening: Last Pap in 2022 in [facility].      Past Medical History:   Diagnosis Date    Hyperlipidemia LDL goal <130 06/08/2023    Uterine polyp 06/08/2023    Vasomotor rhinitis 06/08/2023        No current outpatient medications on file prior to visit.     No current facility-administered medications on file prior to visit.        Review of patient's allergies indicates:   Allergen Reactions    Shrimp Itching      Review of Systems   Constitutional:  Negative for chills, diaphoresis and fever.   Eyes:  Negative for blurred vision and double vision.   Respiratory:  Negative for cough and shortness of breath.    Cardiovascular:  Negative for chest pain and leg swelling.   Gastrointestinal:  Negative for abdominal pain, diarrhea, nausea and vomiting.        Abdominal bloating   Skin:  Negative for rash.   Neurological:  Negative for dizziness, tremors and headaches.       Objective:     Vitals:    06/06/24 1436   BP: 110/79   BP Location: Right arm   Patient Position: Sitting   Pulse: 67   Resp: 18   Temp: 98.3 °F (36.8 °C)   TempSrc: Oral   SpO2: 99%   Weight: 53.2 kg (117 lb 3.2 oz)   Height: 5' (1.524 m)      Physical Exam  Constitutional:       General: She is not in acute distress.     Appearance: Normal appearance. She is not ill-appearing, toxic-appearing or diaphoretic.   HENT:      Head: Normocephalic and atraumatic.      Right Ear: External ear normal.      Left Ear: External ear normal.      Nose: Nose normal.   Eyes:      Extraocular Movements: Extraocular movements intact.      Conjunctiva/sclera: Conjunctivae normal.   Pulmonary:      Effort: Pulmonary effort is normal. No respiratory distress.   Musculoskeletal:      Cervical back: Normal range of motion.   Skin:     Coloration: Skin is not pale.   Neurological:      General: No focal deficit present.      Mental Status: She is alert and oriented to person, place, and time. Mental status is at baseline.   Psychiatric:         Mood and Affect: Mood normal.         Behavior: Behavior normal.         Thought Content: Thought content normal.         Judgment: Judgment normal.         Assessment:     1. Need for diphtheria-tetanus-pertussis (Tdap) vaccine    2. Infertility, female    3. Hyperlipidemia LDL goal <130    4. Generalized bloating    5. Dyspepsia    6. Abdominal discomfort      Plan:     1. Need for diphtheria-tetanus-pertussis (Tdap) vaccine  - VFC-Tdap (BOOSTRIX) vaccine 0.5 mL    2. Infertility, female  - Ambulatory referral/consult to Obstetrics / Gynecology; Future  - US Pelvis Complete Non OB; Future    3. Hyperlipidemia LDL goal <130  - Lipid Panel; Future  Denies chest pain, SOB, headaches, dizziness, or muscle cramps.  FLP ordered  Consider Omega-3 fatty acids/Fish Oil supplements. Increase dietary fiber intake.  Recommend low fat, low cholesterol diet and exercise.    4. Generalized bloating  - Tissue transglutaminase, IgA; Future  - IgA; Future  - Celiac Disease Comprehensive Panel; Future    5. Dyspepsia  - Tissue transglutaminase, IgA; Future  - IgA; Future  - Celiac Disease Comprehensive Panel; Future  Start PPI as prescribed. Take at  least 30 min prior to eating.  Avoid lying down after eating.  Avoid acidic or heavily seasoned foods such as tomato sauce, citrus fruits, etc.  Identify trigger foods and avoid if possible.  Increase water intake.  Monitor for worsening symptoms and contact clinic if any concerns arise.  Consider referral to GI for EGD if symptoms persist.    6. Abdominal discomfort  - Tissue transglutaminase, IgA; Future  - IgA; Future  - Celiac Disease Comprehensive Panel; Future       Follow up in about 6 months (around 12/6/2024) for Wellness Visit.  Bhavna Dillon was given education on their disease process and medications.

## 2024-06-10 LAB
ELIA CELIKEY IGA (TTG IGA) QUANTITATIVE: 0.5 U/ML
IGA SERPL-MCNC: 193 MG/DL (ref 61–356)
IMMUNOLOGIST REVIEW: NORMAL
TTG IGA SER IA-ACNC: <1.2 U/ML

## 2024-06-17 ENCOUNTER — TELEPHONE (OUTPATIENT)
Dept: FAMILY MEDICINE | Facility: CLINIC | Age: 39
End: 2024-06-17
Payer: COMMERCIAL

## 2024-06-17 DIAGNOSIS — Z12.4 CERVICAL CANCER SCREENING: Primary | ICD-10-CM

## 2024-06-17 NOTE — TELEPHONE ENCOUNTER
I have signed for the following orders AND/OR meds.  Please call the patient and ask the patient to schedule the testing AND/OR inform about any medications that were sent.      Orders Placed This Encounter   Procedures    Ambulatory referral/consult to Obstetrics / Gynecology     Standing Status:   Future     Standing Expiration Date:   7/17/2025     Referral Priority:   Routine     Referral Type:   Consultation     Referral Reason:   Specialty Services Required     Referred to Provider:   Carolann Velasco MD     Requested Specialty:   Obstetrics and Gynecology     Number of Visits Requested:   1

## 2024-06-17 NOTE — TELEPHONE ENCOUNTER
----- Message from Yudith Rubio sent at 6/17/2024 10:55 AM CDT -----  .Who Called: Bhavna Dillon    Caller is requesting assistance/information from provider's office.    Symptoms (please be specific): n/a   How long has patient had these symptoms:  n/a  List of preferred pharmacies on file (remove unneeded): [unfilled]  If different, enter pharmacy into here including location and phone number: n/a      Preferred Method of Contact: Phone Call  Patient's Preferred Phone Number on File: [phone]   Best Call Back Number, if different:  Additional Information: Pt is calling to advise provider that the [facility]  [facility] that she was referred to is currently not accepting New patients or delivering. Pt would like a call back please call to advise

## 2024-06-25 ENCOUNTER — TELEPHONE (OUTPATIENT)
Dept: FAMILY MEDICINE | Facility: CLINIC | Age: 39
End: 2024-06-25
Payer: COMMERCIAL

## 2024-06-25 NOTE — TELEPHONE ENCOUNTER
"Pt stated she has questions regarding her blood work and pelvis US. Pt stated she does not understand why the test are being ran and there is a reason for the test being ordered. Notified US pelvic had Dx of infertility. Pt stated she is not infertile due to her having a child. Pt stated she has been trying to conceive for one year. Pt gets irate and raises her voice stating," Why isn't Dr. Anderson calling me herself. She knows everything about these cases and I shouldn't have to explain over and over again. These test are being ran for a reason, but according to Kaitlynn, there is nothing further to be done." Pt notified Kerline Bolden LPN and supervisor will call pt. The conversation was then ended abruptly.   "

## 2024-06-25 NOTE — TELEPHONE ENCOUNTER
----- Message from Bruce Novoa sent at 6/25/2024  8:24 AM CDT -----  Type:  Needs Medical Advice    Who Called: pt   Symptoms (please be specific):    How long has patient had these symptoms:    Pharmacy name and phone #:    Would the patient rather a call back or a response via [facility]?   Best Call Back Number:  [phone]  Additional Information: pt req call from Dr. Singleton about lab results , doesn't want to speak to nurse, stated she isnt getting clear information.

## 2024-06-25 NOTE — TELEPHONE ENCOUNTER
I spoke with patient and she stated a bunch of people have already called her with results and they are normal. She is still having symptoms and stated she wants to know the next steps for her to take. She is trying to have a baby. She stated she doesn't know why she needs to discuss this with me. I told her she does not have to discuss with me and offerred her to schedule a virtual visit with Dr. Singleton multiple times to discuss with her. She refused and wants a call from Dr. Singleton. Please advise. Angela

## 2024-06-26 NOTE — TELEPHONE ENCOUNTER
I spoke with patient and advised her that Dr. Singleton would like to see her for a virtual visit today so that she will have dedicated time to speak with her and discuss her care plan going forward. She voiced understanding and agreed to a visit at 3:45pm. I did give her instructions on how to join the virtual visit. She voiced understanding. Angela

## 2024-06-27 ENCOUNTER — TELEPHONE (OUTPATIENT)
Dept: FAMILY MEDICINE | Facility: CLINIC | Age: 39
End: 2024-06-27
Payer: COMMERCIAL

## 2024-06-27 NOTE — TELEPHONE ENCOUNTER
Attempted to call patient at number provided in EHR. Patient appeared to answer phone call however phone call was disrupted. Patient was then called again but no answer. Patient was also sent link to join virtual visit however did not log on.

## 2024-06-27 NOTE — TELEPHONE ENCOUNTER
----- Message from Ela Gillis sent at 6/26/2024  4:58 PM CDT -----  .Type:  Patient Returning Call    Who Called:pt   Who Left Message for Patient:Lacie   Does the patient know what this is regarding?:returning phone call   Would the patient rather a call back or a response via [facility]? Call back   Best Call Back Number:[phone]  Additional Information:

## 2024-06-28 ENCOUNTER — PATIENT MESSAGE (OUTPATIENT)
Dept: FAMILY MEDICINE | Facility: CLINIC | Age: 39
End: 2024-06-28
Payer: COMMERCIAL

## 2024-11-04 ENCOUNTER — TELEPHONE (OUTPATIENT)
Dept: FAMILY MEDICINE | Facility: CLINIC | Age: 39
End: 2024-11-04
Payer: COMMERCIAL

## 2024-11-04 NOTE — TELEPHONE ENCOUNTER
----- Message from Nimisha sent at 11/4/2024  4:02 PM CST -----  Regarding: referral  Who Called: Bhavna Dillon    Caller is requesting assistance/information from provider's office.    Symptoms (please be specific):      How long has patient had these symptoms:      List of preferred pharmacies on file (remove unneeded): [unfilled]  If different, enter pharmacy into here including location and phone number:         Preferred Method of Contact: Phone Call  Patient's Preferred Phone Number on File: [phone]   Best Call Back Number, if different:  Additional Information: Pt is requesting an OB referral, preferrably to a female doctor and also a doctor who performs deliveries; pt stated that she has been attempting to get a referral; Dr. Singleton had previously referred her over to Dr. Mamadou Zendejas and Dr. Carolann Velasco, who both were not accepting any new pts at that moment; please advise and contact pt.

## 2024-11-20 ENCOUNTER — TELEPHONE (OUTPATIENT)
Dept: FAMILY MEDICINE | Facility: CLINIC | Age: 39
End: 2024-11-20
Payer: COMMERCIAL

## 2024-11-20 DIAGNOSIS — Z01.419 WOMEN'S ANNUAL ROUTINE GYNECOLOGICAL EXAMINATION: Primary | ICD-10-CM

## 2024-11-20 NOTE — TELEPHONE ENCOUNTER
----- Message from Ayla sent at 11/20/2024  3:43 PM CST -----  Who Called: Karliecollinerika Dillon    Does the patient already have the specialty appointment scheduled?:  If yes, what is the date of that appointment?:  Referral to What Specialty:Gynecology   Reason for Referral: yearly pap smear  Does the patient want the referral with a specific physician?:no  If yes, which provider?:   Is the specialist [facility]?:    Preferred Method of Contact: Phone Call  Patient's Preferred Phone Number on File: [phone]   Best Call Back Number, if different:  Additional Information: prefers female provider.

## 2024-11-22 ENCOUNTER — TELEPHONE (OUTPATIENT)
Dept: FAMILY MEDICINE | Facility: CLINIC | Age: 39
End: 2024-11-22
Payer: COMMERCIAL

## 2024-11-22 NOTE — TELEPHONE ENCOUNTER
Patient notified that we only offer flu shot injection, and advised to maybe try local health unit to see if they offer flu mist. She verbalized understanding

## 2024-11-22 NOTE — TELEPHONE ENCOUNTER
----- Message from XOR.MOTORS sent at 11/22/2024  8:09 AM CST -----  .Type:  Patient Returning Call    Who Called:pt  Who Left Message for Patient:pt  Does the patient know what this is regarding?: flu mist appt  Would the patient rather a call back or a response via [facility]? lasha  Best Call Back Number:[phone]  Additional Information: Please call back would like a flu mist appt

## 2024-11-27 ENCOUNTER — OFFICE VISIT (OUTPATIENT)
Dept: GYNECOLOGY | Facility: CLINIC | Age: 39
End: 2024-11-27
Payer: COMMERCIAL

## 2024-11-27 VITALS
BODY MASS INDEX: 22.58 KG/M2 | TEMPERATURE: 98 F | OXYGEN SATURATION: 100 % | HEIGHT: 60 IN | WEIGHT: 115 LBS | SYSTOLIC BLOOD PRESSURE: 113 MMHG | RESPIRATION RATE: 18 BRPM | HEART RATE: 64 BPM | DIASTOLIC BLOOD PRESSURE: 78 MMHG

## 2024-11-27 DIAGNOSIS — Z12.4 ENCOUNTER FOR PAPANICOLAOU SMEAR FOR CERVICAL CANCER SCREENING: Primary | ICD-10-CM

## 2024-11-27 DIAGNOSIS — Z01.419 WOMEN'S ANNUAL ROUTINE GYNECOLOGICAL EXAMINATION: ICD-10-CM

## 2024-11-27 PROCEDURE — 99214 OFFICE O/P EST MOD 30 MIN: CPT | Mod: PBBFAC

## 2024-11-27 PROCEDURE — 3008F BODY MASS INDEX DOCD: CPT | Mod: CPTII,,,

## 2024-11-27 PROCEDURE — 3074F SYST BP LT 130 MM HG: CPT | Mod: CPTII,,,

## 2024-11-27 PROCEDURE — 3078F DIAST BP <80 MM HG: CPT | Mod: CPTII,,,

## 2024-11-27 PROCEDURE — 1159F MED LIST DOCD IN RCRD: CPT | Mod: CPTII,,,

## 2024-11-27 PROCEDURE — 99385 PREV VISIT NEW AGE 18-39: CPT | Mod: S$PBB,,,

## 2024-11-27 NOTE — PROGRESS NOTES
[facility] -  Gynecology / Women's Health Clinic     Subjective:      Patient ID: Bhavna Dillon is a 39 y.o. female.    Chief Complaint:  Well Woman      History of Present Illness:  The patient  here for annual exam. Her LMP was 24. Period last 2-4 days and changes pads 3x/day on heaviest 1 day, cycles monthly/manageable. Denies history of abnormal paps. Denies breast or urinary complaints. Denies pelvic pain, abnormal bleeding or discharge. Pt reports no STIs in the past and no concerns. Declines contraception, desires pregnancy. PCP recently ordered fertility labs/imaging, all unremarkable. Denies tobacco use. Dep. screening 0. Denies fly hx of breast, uterine or colon cancer. MGM with ovarian cancer.     GYN & OB History:  Patient's last menstrual period was 2024.     OB History    Para Term  AB Living   1 1       1   SAB IAB Ectopic Multiple Live Births           1      # Outcome Date GA Lbr Jonathan/2nd Weight Sex Type Anes PTL Lv   1 Para      Vag-Vacuum   JUDSON       Past Medical History:   Diagnosis Date    Hyperlipidemia LDL goal <130 2023    Uterine polyp 2023    Vasomotor rhinitis 2023        Past Surgical History:   Procedure Laterality Date    ADENOIDECTOMY      APPENDECTOMY  2019    I was 3 months pregnant        Social History     Tobacco Use    Smoking status: Never    Smokeless tobacco: Never   Substance and Sexual Activity    Alcohol use: Yes     Alcohol/week: 5.0 standard drinks of alcohol     Types: 5 Glasses of wine per week    Drug use: Never    Sexual activity: Yes     Partners: Male     Birth control/protection: None        No current outpatient medications    Review of patient's allergies indicates:   Allergen Reactions    Shrimp Itching         Review of Systems:  Review of Systems  Negative except for pertinent findings for positives per HPI.     Objective:     Physical Exam   Visit Vitals  /78 (BP Location: Right arm,  Patient Position: Sitting)   Pulse 64   Temp 97.6 °F (36.4 °C) (Oral)   Resp 18   Ht 5' (1.524 m)   Wt 52.2 kg (115 lb)   LMP 11/01/2024   SpO2 100%   BMI 22.46 kg/m²       GENERAL: Well-developed female. No acute distress.    SKIN: Normal to inspection, warm and intact.  BREASTS: No rashes or erythema. No masses, lumps, discharge, tenderness.  VULVA: General appearance normal; external genitalia with no lesions or erythema.  VAGINA: Mucosa/vaginal vault pink, no abnormal discharge or lesions.  CERVIX: Pink, parous appearing os, no erythema or abnormal discharge.  BIMANUAL EXAM: reveals a 8 week-sized uterus. The uterus is non tender. Bilateral adnexa reveal no tenderness.  PSYCHIATRIC: Patient is oriented to person, place, and time. Mood and affect are normal.    Assessment:       ICD-10-CM ICD-9-CM   1. Encounter for Papanicolaou smear for cervical cancer screening  Z12.4 V76.2   2. Women's annual routine gynecological examination  Z01.419 V72.31       Plan:     1. Encounter for Papanicolaou smear for cervical cancer screening  -     Liquid-Based Pap Smear, Screening    2. Women's annual routine gynecological examination  -     Ambulatory referral/consult to Gynecology    Pap today    Denies previous STDs and states has one child with partner.  Continue with healthy eating daily and increase physical activity.  OTC prenatal vitamins.  Consider partner semen analysis which is usually more affordable than female fertility workup.  OTC ovulation test (Clear Blue Easy has smiley face when ovulating) and also reviewed ovulation cycle with patient and best time to use ovulation kits.  Info for fertility specialist if still desires a fertility workup, stated has appt 1/2025 with local fertility specialist    Call with any GYN concerns    Follow up in about 1 year (around 11/27/2025) for Annual.

## 2024-12-11 ENCOUNTER — TELEPHONE (OUTPATIENT)
Dept: GYNECOLOGY | Facility: CLINIC | Age: 39
End: 2024-12-11
Payer: COMMERCIAL

## 2024-12-11 NOTE — TELEPHONE ENCOUNTER
verified. Pap smear results explained to patient. Per ACOG recommendations repeat pap smear in 1 yr at annual exam. Immune health tips discussed. Patient stated understanding. Requesting HPV vaccine, did not receive any dosages.    Schedule patient with nurse visit on Thursday that is available for HPV vaccine  Benefits and risk associated with HPV and the Gardasil vaccine discussed with pt. HPV vaccine today, #2 in 2 months and #3 in 6 months after 1st injection. Side effects such as pain, swelling, redness/itching, fever, nausea and dizziness can occur. Recommend waiting 15 min in waiting room following injection.

## 2024-12-11 NOTE — TELEPHONE ENCOUNTER
Nurse:  verified per our conversation and she is schedule for tomorrow as a nurse visit and she has been notified per Gabriella García NP request.

## 2024-12-12 ENCOUNTER — CLINICAL SUPPORT (OUTPATIENT)
Dept: GYNECOLOGY | Facility: CLINIC | Age: 39
End: 2024-12-12
Payer: COMMERCIAL

## 2024-12-12 DIAGNOSIS — Z23 NEED FOR HPV VACCINE: Primary | ICD-10-CM

## 2024-12-12 PROCEDURE — 99211 OFF/OP EST MAY X REQ PHY/QHP: CPT | Mod: PBBFAC

## 2024-12-12 PROCEDURE — 90651 9VHPV VACCINE 2/3 DOSE IM: CPT | Mod: PBBFAC

## 2024-12-12 PROCEDURE — 90471 IMMUNIZATION ADMIN: CPT | Mod: PBBFAC

## 2024-12-12 RX ADMIN — HUMAN PAPILLOMAVIRUS 9-VALENT VACCINE, RECOMBINANT 0.5 ML: 30; 40; 60; 40; 20; 20; 20; 20; 20 INJECTION, SUSPENSION INTRAMUSCULAR at 08:12

## 2025-01-28 ENCOUNTER — LAB VISIT (OUTPATIENT)
Dept: LAB | Facility: HOSPITAL | Age: 40
End: 2025-01-28
Attending: STUDENT IN AN ORGANIZED HEALTH CARE EDUCATION/TRAINING PROGRAM
Payer: COMMERCIAL

## 2025-01-28 ENCOUNTER — TELEPHONE (OUTPATIENT)
Dept: FAMILY MEDICINE | Facility: CLINIC | Age: 40
End: 2025-01-28
Payer: COMMERCIAL

## 2025-01-28 DIAGNOSIS — Z13.220 NEED FOR LIPID SCREENING: ICD-10-CM

## 2025-01-28 DIAGNOSIS — Z11.59 ENCOUNTER FOR HEPATITIS C SCREENING TEST FOR LOW RISK PATIENT: ICD-10-CM

## 2025-01-28 DIAGNOSIS — Z11.4 ENCOUNTER FOR SCREENING FOR HIV: ICD-10-CM

## 2025-01-28 DIAGNOSIS — Z00.00 ANNUAL PHYSICAL EXAM: ICD-10-CM

## 2025-01-28 DIAGNOSIS — Z13.1 DIABETES MELLITUS SCREENING: ICD-10-CM

## 2025-01-28 DIAGNOSIS — Z00.00 ANNUAL PHYSICAL EXAM: Primary | ICD-10-CM

## 2025-01-28 LAB
ALBUMIN SERPL-MCNC: 4.2 G/DL (ref 3.5–5)
ALBUMIN/GLOB SERPL: 1.2 RATIO (ref 1.1–2)
ALP SERPL-CCNC: 42 UNIT/L (ref 40–150)
ALT SERPL-CCNC: 14 UNIT/L (ref 0–55)
ANION GAP SERPL CALC-SCNC: 10 MEQ/L
AST SERPL-CCNC: 18 UNIT/L (ref 5–34)
BASOPHILS # BLD AUTO: 0.02 X10(3)/MCL
BASOPHILS NFR BLD AUTO: 0.3 %
BILIRUB SERPL-MCNC: 0.4 MG/DL
BUN SERPL-MCNC: 8.8 MG/DL (ref 7–18.7)
CALCIUM SERPL-MCNC: 9.3 MG/DL (ref 8.4–10.2)
CHLORIDE SERPL-SCNC: 105 MMOL/L (ref 98–107)
CHOLEST SERPL-MCNC: 219 MG/DL
CHOLEST/HDLC SERPL: 3 {RATIO} (ref 0–5)
CO2 SERPL-SCNC: 24 MMOL/L (ref 22–29)
CREAT SERPL-MCNC: 0.79 MG/DL (ref 0.55–1.02)
CREAT/UREA NIT SERPL: 11
EOSINOPHIL # BLD AUTO: 0.15 X10(3)/MCL (ref 0–0.9)
EOSINOPHIL NFR BLD AUTO: 2.2 %
ERYTHROCYTE [DISTWIDTH] IN BLOOD BY AUTOMATED COUNT: 11.9 % (ref 11.5–17)
EST. AVERAGE GLUCOSE BLD GHB EST-MCNC: 99.7 MG/DL
GFR SERPLBLD CREATININE-BSD FMLA CKD-EPI: >60 ML/MIN/1.73/M2
GLOBULIN SER-MCNC: 3.4 GM/DL (ref 2.4–3.5)
GLUCOSE SERPL-MCNC: 91 MG/DL (ref 74–100)
HBA1C MFR BLD: 5.1 %
HCT VFR BLD AUTO: 40.4 % (ref 37–47)
HCV AB SERPL QL IA: NONREACTIVE
HDLC SERPL-MCNC: 76 MG/DL (ref 35–60)
HGB BLD-MCNC: 13.7 G/DL (ref 12–16)
HIV 1+2 AB+HIV1 P24 AG SERPL QL IA: NONREACTIVE
IMM GRANULOCYTES # BLD AUTO: 0.01 X10(3)/MCL (ref 0–0.04)
IMM GRANULOCYTES NFR BLD AUTO: 0.1 %
LDLC SERPL CALC-MCNC: 126 MG/DL (ref 50–140)
LYMPHOCYTES # BLD AUTO: 2.28 X10(3)/MCL (ref 0.6–4.6)
LYMPHOCYTES NFR BLD AUTO: 32.7 %
MCH RBC QN AUTO: 31.9 PG (ref 27–31)
MCHC RBC AUTO-ENTMCNC: 33.9 G/DL (ref 33–36)
MCV RBC AUTO: 94 FL (ref 80–94)
MONOCYTES # BLD AUTO: 0.41 X10(3)/MCL (ref 0.1–1.3)
MONOCYTES NFR BLD AUTO: 5.9 %
NEUTROPHILS # BLD AUTO: 4.1 X10(3)/MCL (ref 2.1–9.2)
NEUTROPHILS NFR BLD AUTO: 58.8 %
NRBC BLD AUTO-RTO: 0 %
PLATELET # BLD AUTO: 293 X10(3)/MCL (ref 130–400)
PMV BLD AUTO: 9.5 FL (ref 7.4–10.4)
POTASSIUM SERPL-SCNC: 3.7 MMOL/L (ref 3.5–5.1)
PROT SERPL-MCNC: 7.6 GM/DL (ref 6.4–8.3)
RBC # BLD AUTO: 4.3 X10(6)/MCL (ref 4.2–5.4)
SODIUM SERPL-SCNC: 139 MMOL/L (ref 136–145)
TRIGL SERPL-MCNC: 85 MG/DL (ref 37–140)
TSH SERPL-ACNC: 2.06 UIU/ML (ref 0.35–4.94)
VLDLC SERPL CALC-MCNC: 17 MG/DL
WBC # BLD AUTO: 6.97 X10(3)/MCL (ref 4.5–11.5)

## 2025-01-28 PROCEDURE — 80053 COMPREHEN METABOLIC PANEL: CPT

## 2025-01-28 PROCEDURE — 36415 COLL VENOUS BLD VENIPUNCTURE: CPT

## 2025-01-28 PROCEDURE — 85025 COMPLETE CBC W/AUTO DIFF WBC: CPT

## 2025-01-28 PROCEDURE — 84443 ASSAY THYROID STIM HORMONE: CPT

## 2025-01-28 PROCEDURE — 87389 HIV-1 AG W/HIV-1&-2 AB AG IA: CPT

## 2025-01-28 PROCEDURE — 86803 HEPATITIS C AB TEST: CPT

## 2025-01-28 PROCEDURE — 80061 LIPID PANEL: CPT

## 2025-01-28 PROCEDURE — 83036 HEMOGLOBIN GLYCOSYLATED A1C: CPT

## 2025-01-28 NOTE — TELEPHONE ENCOUNTER
----- Message from Kytao sent at 1/28/2025 12:03 PM CST -----  .Who Called: Bhavna Dillon    Caller is requesting assistance/information from provider's office.    Symptoms (please be specific): pt states that the labs are not in the system  and she is at the lab now. Please add the labs because the pt is fasting   How long has patient had these symptoms:    List of preferred pharmacies on file (remove unneeded): [unfilled]  If different, enter pharmacy into here including location and phone number:       Preferred Method of Contact: Phone Call  Patient's Preferred Phone Number on File: [phone]   Best Call Back Number, if different:  Additional Information: call the back line no answer

## 2025-01-28 NOTE — TELEPHONE ENCOUNTER
I have ordered the following labs. Please notify the patient.    Orders Placed This Encounter   Procedures    CBC Auto Differential     Standing Status:   Future     Standing Expiration Date:   4/28/2025    Comprehensive Metabolic Panel     Standing Status:   Future     Standing Expiration Date:   4/28/2025    Hemoglobin A1C     Standing Status:   Future     Standing Expiration Date:   4/28/2025    Hepatitis C Antibody     Standing Status:   Future     Standing Expiration Date:   3/29/2026     Order Specific Question:   Release to patient     Answer:   Immediate    HIV 1/2 Ag/Ab (4th Gen)     Standing Status:   Future     Standing Expiration Date:   3/29/2026     Order Specific Question:   Release to patient     Answer:   Immediate    Lipid Panel     Standing Status:   Future     Standing Expiration Date:   4/28/2025    TSH     Standing Status:   Future     Standing Expiration Date:   4/28/2025

## 2025-01-29 ENCOUNTER — OFFICE VISIT (OUTPATIENT)
Dept: FAMILY MEDICINE | Facility: CLINIC | Age: 40
End: 2025-01-29
Payer: COMMERCIAL

## 2025-01-29 VITALS
HEART RATE: 78 BPM | BODY MASS INDEX: 22.8 KG/M2 | OXYGEN SATURATION: 99 % | HEIGHT: 60 IN | DIASTOLIC BLOOD PRESSURE: 68 MMHG | WEIGHT: 116.13 LBS | TEMPERATURE: 99 F | SYSTOLIC BLOOD PRESSURE: 112 MMHG | RESPIRATION RATE: 14 BRPM

## 2025-01-29 DIAGNOSIS — Z00.00 ANNUAL PHYSICAL EXAM: Primary | ICD-10-CM

## 2025-01-29 PROBLEM — E78.5 HYPERLIPIDEMIA LDL GOAL <130: Status: RESOLVED | Noted: 2023-06-08 | Resolved: 2025-01-29

## 2025-01-29 PROBLEM — N84.0 UTERINE POLYP: Status: RESOLVED | Noted: 2023-06-08 | Resolved: 2025-01-29

## 2025-01-29 PROBLEM — J30.0 VASOMOTOR RHINITIS: Status: RESOLVED | Noted: 2023-06-08 | Resolved: 2025-01-29

## 2025-01-29 PROCEDURE — 3008F BODY MASS INDEX DOCD: CPT | Mod: CPTII,,, | Performed by: STUDENT IN AN ORGANIZED HEALTH CARE EDUCATION/TRAINING PROGRAM

## 2025-01-29 PROCEDURE — 99395 PREV VISIT EST AGE 18-39: CPT | Mod: ,,, | Performed by: STUDENT IN AN ORGANIZED HEALTH CARE EDUCATION/TRAINING PROGRAM

## 2025-01-29 PROCEDURE — 3044F HG A1C LEVEL LT 7.0%: CPT | Mod: CPTII,,, | Performed by: STUDENT IN AN ORGANIZED HEALTH CARE EDUCATION/TRAINING PROGRAM

## 2025-01-29 PROCEDURE — 3078F DIAST BP <80 MM HG: CPT | Mod: CPTII,,, | Performed by: STUDENT IN AN ORGANIZED HEALTH CARE EDUCATION/TRAINING PROGRAM

## 2025-01-29 PROCEDURE — 1159F MED LIST DOCD IN RCRD: CPT | Mod: CPTII,,, | Performed by: STUDENT IN AN ORGANIZED HEALTH CARE EDUCATION/TRAINING PROGRAM

## 2025-01-29 PROCEDURE — 3074F SYST BP LT 130 MM HG: CPT | Mod: CPTII,,, | Performed by: STUDENT IN AN ORGANIZED HEALTH CARE EDUCATION/TRAINING PROGRAM

## 2025-01-29 NOTE — PROGRESS NOTES
Subjective:      Patient ID: Bhavna Dillon is a 39 y.o. [place]ian female.    Chief Complaint: Establish Care/Wellness    Preventative Health: CBC w/diff overall negative. CMP negative. Lipid panel positive for elevated cholesterol 219. A1c negative. TSH negative. Hepatitis C Antibody negative. HIV negative. Patient is following with OB-GYN for her well-woman exam.    FH: Patient denies any cancers in a first-degree relative.    Review of Systems   Constitutional:  Negative for activity change, appetite change, chills, diaphoresis, fatigue, fever and unexpected weight change.   Eyes:  Negative for visual disturbance.   Respiratory:  Negative for apnea, cough, shortness of breath, wheezing and stridor.    Cardiovascular:  Negative for chest pain, palpitations and leg swelling.   Gastrointestinal:  Negative for abdominal pain, blood in stool, constipation, diarrhea, nausea and vomiting.   Genitourinary:  Negative for dysuria and hematuria.   Musculoskeletal:  Negative for arthralgias, back pain and myalgias.   Skin:  Negative for rash and wound.   Neurological:  Negative for dizziness, syncope, weakness, numbness and headaches.   Psychiatric/Behavioral:  Negative for behavioral problems, dysphoric mood and sleep disturbance. The patient is not nervous/anxious.      Objective:   /68 (BP Location: Left arm, Patient Position: Sitting)   Pulse 78   Temp 98.5 °F (36.9 °C) (Temporal)   Resp 14   Ht 5' (1.524 m)   Wt 52.7 kg (116 lb 1.6 oz)   SpO2 99%   BMI 22.67 kg/m²     Physical Exam  Vitals and nursing note reviewed.   Constitutional:       General: She is not in acute distress.     Appearance: Normal appearance. She is not ill-appearing or toxic-appearing.   HENT:      Head: Normocephalic and atraumatic.      Mouth/Throat:      Mouth: Mucous membranes are moist.      Pharynx: Oropharynx is clear.   Eyes:      Conjunctiva/sclera: Conjunctivae normal.   Cardiovascular:      Rate and Rhythm: Normal rate and  regular rhythm.      Heart sounds: Normal heart sounds. No murmur heard.  Pulmonary:      Effort: Pulmonary effort is normal. No respiratory distress.      Breath sounds: Normal breath sounds.   Abdominal:      General: There is no distension.      Palpations: Abdomen is soft.      Tenderness: There is no abdominal tenderness.   Musculoskeletal:         General: No deformity.      Cervical back: Neck supple. No tenderness.      Right lower leg: No edema.      Left lower leg: No edema.   Lymphadenopathy:      Cervical: No cervical adenopathy.   Skin:     General: Skin is warm and dry.      Findings: No lesion or rash.   Neurological:      General: No focal deficit present.      Mental Status: She is alert.      Motor: No weakness.      Coordination: Coordination normal.   Psychiatric:         Mood and Affect: Mood normal.         Behavior: Behavior normal.         Thought Content: Thought content normal.         Judgment: Judgment normal.       Assessment/Plan:   1. Annual physical exam  Comments:  - Health maintenance reviewed.  - Lab results reviewed with patient.       Follow up in about 1 year (around 1/29/2026).

## 2025-02-20 ENCOUNTER — CLINICAL SUPPORT (OUTPATIENT)
Dept: GYNECOLOGY | Facility: CLINIC | Age: 40
End: 2025-02-20
Payer: COMMERCIAL

## 2025-02-20 DIAGNOSIS — Z23 NEED FOR HPV VACCINE: Primary | ICD-10-CM

## 2025-02-20 PROCEDURE — 99211 OFF/OP EST MAY X REQ PHY/QHP: CPT | Mod: PBBFAC

## 2025-02-20 PROCEDURE — 90471 IMMUNIZATION ADMIN: CPT | Mod: PBBFAC

## 2025-02-20 PROCEDURE — 90651 9VHPV VACCINE 2/3 DOSE IM: CPT | Mod: PBBFAC

## 2025-02-20 RX ADMIN — HUMAN PAPILLOMAVIRUS 9-VALENT VACCINE, RECOMBINANT 0.5 ML: 30; 40; 60; 40; 20; 20; 20; 20; 20 INJECTION, SUSPENSION INTRAMUSCULAR at 08:02

## 2025-02-20 NOTE — PROGRESS NOTES
HPV Gardasil number two of three given to left deltoid tolerated well, provided with next appointment date and time for dose number three.

## 2025-03-10 ENCOUNTER — OFFICE VISIT (OUTPATIENT)
Dept: URGENT CARE | Facility: CLINIC | Age: 40
End: 2025-03-10
Payer: COMMERCIAL

## 2025-03-10 VITALS
BODY MASS INDEX: 22.78 KG/M2 | WEIGHT: 116 LBS | OXYGEN SATURATION: 99 % | RESPIRATION RATE: 20 BRPM | SYSTOLIC BLOOD PRESSURE: 119 MMHG | HEART RATE: 70 BPM | TEMPERATURE: 97 F | DIASTOLIC BLOOD PRESSURE: 84 MMHG | HEIGHT: 60 IN

## 2025-03-10 DIAGNOSIS — R05.9 COUGH, UNSPECIFIED TYPE: ICD-10-CM

## 2025-03-10 DIAGNOSIS — J06.9 ACUTE URI: Primary | ICD-10-CM

## 2025-03-10 DIAGNOSIS — J02.9 SORE THROAT: ICD-10-CM

## 2025-03-10 LAB
CTP QC/QA: YES
MOLECULAR STREP A: NEGATIVE
POC MOLECULAR INFLUENZA A AGN: NEGATIVE
POC MOLECULAR INFLUENZA B AGN: NEGATIVE
SARS CORONAVIRUS 2 ANTIGEN: NEGATIVE

## 2025-03-10 PROCEDURE — 87502 INFLUENZA DNA AMP PROBE: CPT | Mod: QW,,, | Performed by: PHYSICIAN ASSISTANT

## 2025-03-10 PROCEDURE — 87651 STREP A DNA AMP PROBE: CPT | Mod: QW,,, | Performed by: PHYSICIAN ASSISTANT

## 2025-03-10 PROCEDURE — 96372 THER/PROPH/DIAG INJ SC/IM: CPT | Mod: ,,, | Performed by: PHYSICIAN ASSISTANT

## 2025-03-10 PROCEDURE — 87811 SARS-COV-2 COVID19 W/OPTIC: CPT | Mod: QW,,, | Performed by: PHYSICIAN ASSISTANT

## 2025-03-10 PROCEDURE — 99204 OFFICE O/P NEW MOD 45 MIN: CPT | Mod: 25,,, | Performed by: PHYSICIAN ASSISTANT

## 2025-03-10 RX ORDER — BETAMETHASONE SODIUM PHOSPHATE AND BETAMETHASONE ACETATE 3; 3 MG/ML; MG/ML
9 INJECTION, SUSPENSION INTRA-ARTICULAR; INTRALESIONAL; INTRAMUSCULAR; SOFT TISSUE
Status: COMPLETED | OUTPATIENT
Start: 2025-03-10 | End: 2025-03-10

## 2025-03-10 RX ADMIN — BETAMETHASONE SODIUM PHOSPHATE AND BETAMETHASONE ACETATE 9 MG: 3; 3 INJECTION, SUSPENSION INTRA-ARTICULAR; INTRALESIONAL; INTRAMUSCULAR; SOFT TISSUE at 03:03

## 2025-03-10 NOTE — PROGRESS NOTES
Subjective:      Patient ID: Bhavna Dillon is a 40 y.o. female.    Vitals:  height is 5' (1.524 m) and weight is 52.6 kg (116 lb). Her tympanic temperature is 97.1 °F (36.2 °C). Her blood pressure is 119/84 and her pulse is 70. Her respiration is 20 and oxygen saturation is 99%.     Chief Complaint: Cough and Sore Throat     Patient is a 40 y.o. female who presents to urgent care with complaints of sore throat, cough, congestion  x3 days. Alleviating factors include robitussin with no relief. Patient denies N/V/D HA BA fever or sob.      ROS   Objective:     Physical Exam   Constitutional: She is oriented to person, place, and time. She appears well-developed. She is cooperative.  Non-toxic appearance. She does not appear ill. No distress.   HENT:   Head: Normocephalic and atraumatic.   Ears:   Right Ear: Hearing, tympanic membrane, external ear and ear canal normal.   Left Ear: Hearing, tympanic membrane, external ear and ear canal normal.   Nose: Nose normal. No nasal deformity. No epistaxis.   Mouth/Throat: Uvula is midline and mucous membranes are normal. No trismus in the jaw. Normal dentition. No uvula swelling. Posterior oropharyngeal erythema present. No oropharyngeal exudate or posterior oropharyngeal edema.   Eyes: Conjunctivae and lids are normal. No scleral icterus.   Neck: Trachea normal and phonation normal. Neck supple. No edema present. No erythema present. No neck rigidity present.   Cardiovascular: Normal rate, regular rhythm, normal heart sounds and normal pulses.   Pulmonary/Chest: Effort normal and breath sounds normal. No respiratory distress. She has no decreased breath sounds. She has no rhonchi.   Abdominal: Normal appearance.   Musculoskeletal: Normal range of motion.         General: No deformity. Normal range of motion.   Neurological: She is alert and oriented to person, place, and time. She exhibits normal muscle tone. Coordination normal.   Skin: Skin is warm, dry, intact, not  diaphoretic and not pale.   Psychiatric: Her speech is normal and behavior is normal. Judgment and thought content normal.   Nursing note and vitals reviewed.         Previous History      Review of patient's allergies indicates:   Allergen Reactions    Shrimp Itching       Past Medical History:   Diagnosis Date    Hyperlipidemia LDL goal <130 06/08/2023    Uterine polyp 06/08/2023    Vasomotor rhinitis 06/08/2023     Current Outpatient Medications   Medication Instructions    pyrilamine-chlophedianoL 12.5-12.5 mg/5 mL Liqd 10 mLs, Oral, 3 times daily     Past Surgical History:   Procedure Laterality Date    ADENOIDECTOMY      APPENDECTOMY  2019    I was 3 months pregnant     Family History   Problem Relation Name Age of Onset    Heart attacks under age 50 Paternal Grandfather Rodney     Heart disease Paternal Grandfather Rodney     Diabetes Maternal Grandmother Arlene Aleksandra     Ovarian cancer Maternal Grandmother Arlene Aleksandra     Cancer Maternal Grandmother Arlene Aleksandra     Diabetes type II Father Frumen     Asthma Father Frumen     Cancer Maternal Aunt Stephanie     Stroke Maternal Aunt Victoria     Cancer Paternal Uncle Rhoneil     Early death Brother Sree        Social History[1]     Physical Exam      Vital Signs Reviewed   /84   Pulse 70   Temp 97.1 °F (36.2 °C) (Tympanic)   Resp 20   Ht 5' (1.524 m)   Wt 52.6 kg (116 lb)   SpO2 99%   BMI 22.65 kg/m²        Procedures    Procedures     Labs     Results for orders placed or performed in visit on 03/10/25   POCT Strep A, Molecular    Collection Time: 03/10/25  3:21 PM   Result Value Ref Range    Molecular Strep A, POC Negative Negative     Acceptable Yes    SARS Coronavirus 2 Antigen, POCT Manual Read    Collection Time: 03/10/25  3:23 PM   Result Value Ref Range    SARS Coronavirus 2 Antigen Negative Negative, Presumptive Negative     Acceptable Yes    POCT Influenza A/B Molecular    Collection Time: 03/10/25  3:24 PM    Result Value Ref Range    POC Molecular Influenza A Ag Negative Negative    POC Molecular Influenza B Ag Negative Negative     Acceptable Yes      Assessment:     1. Acute URI    2. Sore throat    3. Cough, unspecified type        Plan:       Acute URI    Sore throat  -     POCT Strep A, Molecular    Cough, unspecified type  -     SARS Coronavirus 2 Antigen, POCT Manual Read  -     POCT Influenza A/B Molecular    Other orders  -     betamethasone acetate-betamethasone sodium phosphate injection 9 mg  -     pyrilamine-chlophedianoL 12.5-12.5 mg/5 mL Liqd; Take 10 mLs by mouth 3 (three) times daily.  Dispense: 180 mL; Refill: 0    Drink plenty of fluids.     Get plenty of rest.     Tylenol or Motrin as needed.     Go to the ER with any significant change or worsening of symptoms.     Follow up with your primary care doctor.                        [1]   Social History  Tobacco Use    Smoking status: Never    Smokeless tobacco: Never   Substance Use Topics    Alcohol use: Yes     Alcohol/week: 5.0 standard drinks of alcohol     Types: 5 Glasses of wine per week    Drug use: Never

## 2025-03-13 ENCOUNTER — TELEPHONE (OUTPATIENT)
Dept: FAMILY MEDICINE | Facility: CLINIC | Age: 40
End: 2025-03-13
Payer: COMMERCIAL

## 2025-03-13 NOTE — TELEPHONE ENCOUNTER
I spoke with patient and she was seen by the urgent care and they treated the patient. She stated now she is coughing up green mucous in the morning and having trouble with mucous in throat and trouble breathing due to mucous, denies SOB. It is better  when she clears her throat. I offered her an appointment with the NP tomorrow and she stated she will just continue to monitor her symptoms and call next week if needed. Angela

## 2025-03-13 NOTE — TELEPHONE ENCOUNTER
----- Message from Ramesh Heard sent at 3/10/2025 12:00 PM CDT -----  Who Called: Bhavna De Oliveira is requesting assistance/information from provider's office.Symptoms (please be specific): N/A How long has patient had these symptoms:  N/AList of preferred pharmacies on file (remove unneeded): [unfilled]If different, enter pharmacy into here including location and phone number: N/APreferred Method of Contact: Phone CallPatient's Preferred Phone Number on File: [phone] Best Call Back Number, if different:Additional Information: Pt asked for cb, did not want to clarify any further

## 2025-03-18 ENCOUNTER — OFFICE VISIT (OUTPATIENT)
Dept: URGENT CARE | Facility: CLINIC | Age: 40
End: 2025-03-18
Payer: COMMERCIAL

## 2025-03-18 VITALS
HEIGHT: 60 IN | DIASTOLIC BLOOD PRESSURE: 71 MMHG | HEART RATE: 67 BPM | WEIGHT: 116 LBS | RESPIRATION RATE: 18 BRPM | TEMPERATURE: 98 F | OXYGEN SATURATION: 99 % | BODY MASS INDEX: 22.78 KG/M2 | SYSTOLIC BLOOD PRESSURE: 107 MMHG

## 2025-03-18 DIAGNOSIS — J02.9 SORE THROAT: Primary | ICD-10-CM

## 2025-03-18 DIAGNOSIS — R05.9 COUGH, UNSPECIFIED TYPE: ICD-10-CM

## 2025-03-18 DIAGNOSIS — J32.9 SINUSITIS, UNSPECIFIED CHRONICITY, UNSPECIFIED LOCATION: ICD-10-CM

## 2025-03-18 PROCEDURE — 87811 SARS-COV-2 COVID19 W/OPTIC: CPT | Mod: QW,,, | Performed by: NURSE PRACTITIONER

## 2025-03-18 PROCEDURE — 99214 OFFICE O/P EST MOD 30 MIN: CPT | Mod: ,,, | Performed by: NURSE PRACTITIONER

## 2025-03-18 PROCEDURE — 87502 INFLUENZA DNA AMP PROBE: CPT | Mod: QW,,, | Performed by: NURSE PRACTITIONER

## 2025-03-18 PROCEDURE — 87651 STREP A DNA AMP PROBE: CPT | Mod: QW,,, | Performed by: NURSE PRACTITIONER

## 2025-03-18 RX ORDER — BENZONATATE 200 MG/1
200 CAPSULE ORAL 3 TIMES DAILY PRN
Qty: 21 CAPSULE | Refills: 0 | Status: SHIPPED | OUTPATIENT
Start: 2025-03-18 | End: 2025-03-28

## 2025-03-18 RX ORDER — AZELASTINE 1 MG/ML
1 SPRAY, METERED NASAL 2 TIMES DAILY
Qty: 30 ML | Refills: 0 | Status: SHIPPED | OUTPATIENT
Start: 2025-03-18 | End: 2025-03-25

## 2025-03-18 RX ORDER — PROMETHAZINE HYDROCHLORIDE AND DEXTROMETHORPHAN HYDROBROMIDE 6.25; 15 MG/5ML; MG/5ML
7.5 SYRUP ORAL NIGHTLY PRN
Qty: 75 ML | Refills: 0 | Status: SHIPPED | OUTPATIENT
Start: 2025-03-18 | End: 2025-03-28

## 2025-03-18 RX ORDER — FLUTICASONE PROPIONATE 50 MCG
2 SPRAY, SUSPENSION (ML) NASAL DAILY
Qty: 18.2 ML | Refills: 0 | Status: SHIPPED | OUTPATIENT
Start: 2025-03-18

## 2025-03-18 RX ORDER — DOXYCYCLINE 100 MG/1
100 CAPSULE ORAL 2 TIMES DAILY
Qty: 14 CAPSULE | Refills: 0 | Status: SHIPPED | OUTPATIENT
Start: 2025-03-18 | End: 2025-03-25

## 2025-03-18 NOTE — PATIENT INSTRUCTIONS
"Please take your antibiotics as prescribed for the entire length of days prescribed.    Home care:  Take the full course of antibiotics as instructed. Do not stop taking them, even if you feel better.  Get rest!  Drink plenty of water, hot tea, and other liquids. This may help thin mucus. It also may promote sinus drainage.  Heat may help soothe painful areas of the face. Use a towel soaked in hot water. Or,  the shower and direct the hot spray onto your face. Using a vaporizer along with a menthol rub at night may also help.   An expectorant containing guaifenesin may help thin the mucus and promote drainage from the sinuses.  Flonase (fluticasone) is an over the counter nasal spray which may help with your symptoms.  Zyrtec D, Claritin D, or Allegra D can also help with symptoms of congestion and drainage  If you have hypertesion, avoid using the "D" which is a decongestant.  If clear drainage, you can take plain zyrtec, claritin, allegra.  If congested, you can take pseudoephedrine-you need to ask for this behind the pharmacy counter-do not take if you have high blood pressure. Pheylephrine is on the shelf and is not effective.  Tylenol or ibuprofen can be used as directed for pain, unless you have allergies. Avoid ibuprofen if medical conditions such as stomach ulcers, kidney or liver disease, or blood thinners  Afrin is effective if flying in the next few days. Take as directed for the airplane flight upon taking off and landing. Do not continue to use Afrin as it will cause rebound congestion.  Don't smoke. This can worsen symptoms.  Follow-up care  Follow up with your healthcare provider or our staff if you are not improving within the next week.     When to seek medical advice  Call your healthcare provider if any of these occur:  Facial pain or headache becoming more severe  Stiff neck  Unusual drowsiness or confusion  Swelling of the forehead or eyelids  Vision problems, including blurred or double " vision  Fever of 100.4ºF (38ºC) or higher, or as directed by your healthcare provider  Seizure  Breathing problems  Symptoms not resolving within 10 days

## 2025-03-18 NOTE — PROGRESS NOTES
Subjective:      Patient ID: Bhavna Dillon is a 40 y.o. female.    Vitals:  height is 5' (1.524 m) and weight is 52.6 kg (116 lb). Her temperature is 98.1 °F (36.7 °C). Her blood pressure is 107/71 and her pulse is 67. Her respiration is 18 and oxygen saturation is 99%.     Chief Complaint: Cough     Patient is a 40 y.o. female who presents to urgent care with complaints of cough, congestion, sore throat, headache, diarrhea x 8 days .  States drinking a large amount of Tdap aid in her raw throat but denies any shortness a breath only slight production of sputum secondary to postnasal drip but cough has been which is worse at night but does lingering through the daytime hours.  Denies any body aches but is sore coughing especially to the mid abdominal area also having slight headache secondary to cough.  His any wheezing nausea or vomiting does have intermittent episodes of diarrhea.  Denies any dizziness, numbness tingling.  States using ninja cough without much alleviation of symptoms.    Cough  Associated symptoms include postnasal drip. Pertinent negatives include no fever, headaches, myalgias or sore throat.     Constitution: Negative for fatigue and fever.   HENT:  Positive for congestion, postnasal drip and sinus pressure. Negative for sinus pain and sore throat.    Cardiovascular: Negative.    Eyes: Negative.    Respiratory:  Positive for cough.    Gastrointestinal:  Positive for diarrhea. Negative for nausea and vomiting.   Endocrine: negative.   Genitourinary:  Negative for dysuria, frequency, urgency and urine decreased.   Musculoskeletal:  Negative for muscle ache.   Neurological: Negative.  Negative for headaches.      Objective:     Physical Exam   Constitutional: She is oriented to person, place, and time. She appears well-developed. She is cooperative.  Non-toxic appearance. She does not appear ill. No distress.   HENT:   Head: Normocephalic and atraumatic.   Ears:   Right Ear: Hearing, tympanic  membrane, external ear and ear canal normal.   Left Ear: Hearing, tympanic membrane, external ear and ear canal normal.   Nose: Nose normal. No mucosal edema, rhinorrhea or nasal deformity. No epistaxis. Right sinus exhibits no maxillary sinus tenderness and no frontal sinus tenderness. Left sinus exhibits no maxillary sinus tenderness and no frontal sinus tenderness.   Mouth/Throat: Uvula is midline, oropharynx is clear and moist and mucous membranes are normal. No trismus in the jaw. Normal dentition. No uvula swelling. No oropharyngeal exudate, posterior oropharyngeal edema or posterior oropharyngeal erythema.   Eyes: Conjunctivae and lids are normal. No scleral icterus.   Neck: Trachea normal and phonation normal. Neck supple. No edema present. No erythema present. No neck rigidity present.   Cardiovascular: Normal rate, regular rhythm, normal heart sounds and normal pulses.   Pulmonary/Chest: Effort normal and breath sounds normal. No respiratory distress. She has no decreased breath sounds. She has no rhonchi.   Abdominal: Normal appearance.   Musculoskeletal: Normal range of motion.         General: No deformity. Normal range of motion.   Neurological: She is alert and oriented to person, place, and time. She exhibits normal muscle tone. Coordination normal.   Skin: Skin is warm, dry, intact, not diaphoretic and not pale.   Psychiatric: Her speech is normal and behavior is normal. Judgment and thought content normal.   Nursing note and vitals reviewed.         Previous History      Review of patient's allergies indicates:   Allergen Reactions    Shrimp Itching       Past Medical History:   Diagnosis Date    Hyperlipidemia LDL goal <130 06/08/2023    Uterine polyp 06/08/2023    Vasomotor rhinitis 06/08/2023     Current Outpatient Medications   Medication Instructions    pyrilamine-chlophedianoL 12.5-12.5 mg/5 mL Liqd 10 mLs, Oral, 3 times daily     Past Surgical History:   Procedure Laterality Date     ADENOIDECTOMY      APPENDECTOMY  2019    I was 3 months pregnant         Social History[1]     Physical Exam      Vital Signs Reviewed   /71   Pulse 67   Temp 98.1 °F (36.7 °C)   Resp 18   Ht 5' (1.524 m)   Wt 52.6 kg (116 lb)   LMP 03/05/2025 (Exact Date)   SpO2 99%   BMI 22.65 kg/m²        Procedures    Procedures     Labs     Results for orders placed or performed in visit on 03/18/25   POCT Strep A, Molecular    Collection Time: 03/18/25  9:53 AM   Result Value Ref Range    Molecular Strep A, POC Negative Negative     Acceptable Yes       Assessment:     1. Sore throat        Plan:       Sore throat  -     SARS Coronavirus 2 Antigen, POCT Manual Read  -     POCT Strep A, Molecular  -     POCT Influenza A/B Molecular                           [1]  Social History  Tobacco Use    Smoking status: Never     Passive exposure: Never    Smokeless tobacco: Never   Substance Use Topics    Alcohol use: Yes     Alcohol/week: 5.0 standard drinks of alcohol     Types: 5 Glasses of wine per week    Drug use: Never

## 2025-06-12 ENCOUNTER — TELEPHONE (OUTPATIENT)
Dept: GYNECOLOGY | Facility: CLINIC | Age: 40
End: 2025-06-12
Payer: COMMERCIAL

## 2025-06-12 NOTE — TELEPHONE ENCOUNTER
Pt called to ask if she can have her HPV due to her trying to have a baby' pt is seeing a fertility doctor. I advised pt that she needs to contact her fertility doctor on if she can have her HPV. Pt voiced they call us. I advised pt that with her trying to have a baby and that she hasn't had a cycle in 1 month she needs to wait and not receive the HPV.